# Patient Record
Sex: FEMALE | HISPANIC OR LATINO | Employment: FULL TIME | URBAN - METROPOLITAN AREA
[De-identification: names, ages, dates, MRNs, and addresses within clinical notes are randomized per-mention and may not be internally consistent; named-entity substitution may affect disease eponyms.]

---

## 2018-06-08 ENCOUNTER — HOSPITAL ENCOUNTER (OUTPATIENT)
Dept: LAB | Facility: MEDICAL CENTER | Age: 38
End: 2018-06-08
Attending: FAMILY MEDICINE

## 2018-06-08 LAB
ALBUMIN SERPL BCP-MCNC: 4.4 G/DL (ref 3.2–4.9)
ALBUMIN/GLOB SERPL: 1.5 G/DL
ALP SERPL-CCNC: 55 U/L (ref 30–99)
ALT SERPL-CCNC: 25 U/L (ref 2–50)
ANION GAP SERPL CALC-SCNC: 6 MMOL/L (ref 0–11.9)
AST SERPL-CCNC: 17 U/L (ref 12–45)
BILIRUB SERPL-MCNC: 0.4 MG/DL (ref 0.1–1.5)
BUN SERPL-MCNC: 13 MG/DL (ref 8–22)
CALCIUM SERPL-MCNC: 9.1 MG/DL (ref 8.5–10.5)
CHLORIDE SERPL-SCNC: 106 MMOL/L (ref 96–112)
CHOLEST SERPL-MCNC: 212 MG/DL (ref 100–199)
CO2 SERPL-SCNC: 25 MMOL/L (ref 20–33)
CREAT SERPL-MCNC: 0.54 MG/DL (ref 0.5–1.4)
ERYTHROCYTE [DISTWIDTH] IN BLOOD BY AUTOMATED COUNT: 44.9 FL (ref 35.9–50)
EST. AVERAGE GLUCOSE BLD GHB EST-MCNC: 111 MG/DL
GLOBULIN SER CALC-MCNC: 3 G/DL (ref 1.9–3.5)
GLUCOSE SERPL-MCNC: 89 MG/DL (ref 65–99)
HBA1C MFR BLD: 5.5 % (ref 0–5.6)
HCT VFR BLD AUTO: 41.6 % (ref 37–47)
HDLC SERPL-MCNC: 48 MG/DL
HGB BLD-MCNC: 13.6 G/DL (ref 12–16)
LDLC SERPL CALC-MCNC: 138 MG/DL
MCH RBC QN AUTO: 29.2 PG (ref 27–33)
MCHC RBC AUTO-ENTMCNC: 32.7 G/DL (ref 33.6–35)
MCV RBC AUTO: 89.5 FL (ref 81.4–97.8)
PLATELET # BLD AUTO: 257 K/UL (ref 164–446)
PMV BLD AUTO: 10 FL (ref 9–12.9)
POTASSIUM SERPL-SCNC: 4.2 MMOL/L (ref 3.6–5.5)
PROT SERPL-MCNC: 7.4 G/DL (ref 6–8.2)
RBC # BLD AUTO: 4.65 M/UL (ref 4.2–5.4)
SODIUM SERPL-SCNC: 137 MMOL/L (ref 135–145)
TRIGL SERPL-MCNC: 132 MG/DL (ref 0–149)
WBC # BLD AUTO: 6.4 K/UL (ref 4.8–10.8)

## 2018-06-08 PROCEDURE — 83036 HEMOGLOBIN GLYCOSYLATED A1C: CPT

## 2018-06-08 PROCEDURE — 80053 COMPREHEN METABOLIC PANEL: CPT

## 2018-06-08 PROCEDURE — 85027 COMPLETE CBC AUTOMATED: CPT

## 2018-06-08 PROCEDURE — 36415 COLL VENOUS BLD VENIPUNCTURE: CPT

## 2018-06-08 PROCEDURE — 80061 LIPID PANEL: CPT

## 2018-11-29 ENCOUNTER — HOSPITAL ENCOUNTER (OUTPATIENT)
Dept: LAB | Facility: MEDICAL CENTER | Age: 38
End: 2018-11-29
Attending: FAMILY MEDICINE

## 2018-11-29 LAB
ALBUMIN SERPL BCP-MCNC: 4.2 G/DL (ref 3.2–4.9)
ALBUMIN/GLOB SERPL: 1.4 G/DL
ALP SERPL-CCNC: 61 U/L (ref 30–99)
ALT SERPL-CCNC: 19 U/L (ref 2–50)
ANION GAP SERPL CALC-SCNC: 6 MMOL/L (ref 0–11.9)
AST SERPL-CCNC: 18 U/L (ref 12–45)
BILIRUB SERPL-MCNC: 0.4 MG/DL (ref 0.1–1.5)
BUN SERPL-MCNC: 12 MG/DL (ref 8–22)
CALCIUM SERPL-MCNC: 8.9 MG/DL (ref 8.5–10.5)
CHLORIDE SERPL-SCNC: 106 MMOL/L (ref 96–112)
CHOLEST SERPL-MCNC: 203 MG/DL (ref 100–199)
CO2 SERPL-SCNC: 26 MMOL/L (ref 20–33)
CREAT SERPL-MCNC: 0.62 MG/DL (ref 0.5–1.4)
GLOBULIN SER CALC-MCNC: 3 G/DL (ref 1.9–3.5)
GLUCOSE SERPL-MCNC: 98 MG/DL (ref 65–99)
HDLC SERPL-MCNC: 50 MG/DL
LDLC SERPL CALC-MCNC: 131 MG/DL
POTASSIUM SERPL-SCNC: 4.9 MMOL/L (ref 3.6–5.5)
PROT SERPL-MCNC: 7.2 G/DL (ref 6–8.2)
SODIUM SERPL-SCNC: 138 MMOL/L (ref 135–145)
TRIGL SERPL-MCNC: 112 MG/DL (ref 0–149)

## 2018-11-29 PROCEDURE — 36415 COLL VENOUS BLD VENIPUNCTURE: CPT

## 2018-11-29 PROCEDURE — 80061 LIPID PANEL: CPT

## 2018-11-29 PROCEDURE — 80053 COMPREHEN METABOLIC PANEL: CPT

## 2019-04-30 ENCOUNTER — HOSPITAL ENCOUNTER (OUTPATIENT)
Dept: LAB | Facility: MEDICAL CENTER | Age: 39
End: 2019-04-30
Attending: FAMILY MEDICINE

## 2019-04-30 LAB
ALBUMIN SERPL BCP-MCNC: 4.2 G/DL (ref 3.2–4.9)
ALBUMIN/GLOB SERPL: 1.5 G/DL
ALP SERPL-CCNC: 59 U/L (ref 30–99)
ALT SERPL-CCNC: 21 U/L (ref 2–50)
ANION GAP SERPL CALC-SCNC: 6 MMOL/L (ref 0–11.9)
AST SERPL-CCNC: 17 U/L (ref 12–45)
BILIRUB SERPL-MCNC: 0.5 MG/DL (ref 0.1–1.5)
BUN SERPL-MCNC: 13 MG/DL (ref 8–22)
CALCIUM SERPL-MCNC: 9.1 MG/DL (ref 8.5–10.5)
CHLORIDE SERPL-SCNC: 105 MMOL/L (ref 96–112)
CHOLEST SERPL-MCNC: 240 MG/DL (ref 100–199)
CO2 SERPL-SCNC: 26 MMOL/L (ref 20–33)
CREAT SERPL-MCNC: 0.57 MG/DL (ref 0.5–1.4)
FASTING STATUS PATIENT QL REPORTED: NORMAL
GLOBULIN SER CALC-MCNC: 2.8 G/DL (ref 1.9–3.5)
GLUCOSE SERPL-MCNC: 97 MG/DL (ref 65–99)
HDLC SERPL-MCNC: 53 MG/DL
LDLC SERPL CALC-MCNC: 167 MG/DL
POTASSIUM SERPL-SCNC: 4.1 MMOL/L (ref 3.6–5.5)
PROT SERPL-MCNC: 7 G/DL (ref 6–8.2)
SODIUM SERPL-SCNC: 137 MMOL/L (ref 135–145)
T4 FREE SERPL-MCNC: 0.73 NG/DL (ref 0.53–1.43)
TRIGL SERPL-MCNC: 102 MG/DL (ref 0–149)
TSH SERPL DL<=0.005 MIU/L-ACNC: 0.96 UIU/ML (ref 0.38–5.33)

## 2019-04-30 PROCEDURE — 80061 LIPID PANEL: CPT

## 2019-04-30 PROCEDURE — 84439 ASSAY OF FREE THYROXINE: CPT

## 2019-04-30 PROCEDURE — 36415 COLL VENOUS BLD VENIPUNCTURE: CPT

## 2019-04-30 PROCEDURE — 84443 ASSAY THYROID STIM HORMONE: CPT

## 2019-04-30 PROCEDURE — 80053 COMPREHEN METABOLIC PANEL: CPT

## 2020-02-19 ENCOUNTER — HOSPITAL ENCOUNTER (OUTPATIENT)
Dept: LAB | Facility: MEDICAL CENTER | Age: 40
End: 2020-02-19
Attending: FAMILY MEDICINE

## 2020-02-19 LAB
ALBUMIN SERPL BCP-MCNC: 4.6 G/DL (ref 3.2–4.9)
ALBUMIN/GLOB SERPL: 1.7 G/DL
ALP SERPL-CCNC: 57 U/L (ref 30–99)
ALT SERPL-CCNC: 24 U/L (ref 2–50)
ANION GAP SERPL CALC-SCNC: 8 MMOL/L (ref 0–11.9)
AST SERPL-CCNC: 24 U/L (ref 12–45)
BILIRUB SERPL-MCNC: 0.4 MG/DL (ref 0.1–1.5)
BUN SERPL-MCNC: 13 MG/DL (ref 8–22)
CALCIUM SERPL-MCNC: 9.2 MG/DL (ref 8.5–10.5)
CHLORIDE SERPL-SCNC: 102 MMOL/L (ref 96–112)
CHOLEST SERPL-MCNC: 252 MG/DL (ref 100–199)
CO2 SERPL-SCNC: 25 MMOL/L (ref 20–33)
CREAT SERPL-MCNC: 0.65 MG/DL (ref 0.5–1.4)
FASTING STATUS PATIENT QL REPORTED: NORMAL
GLOBULIN SER CALC-MCNC: 2.7 G/DL (ref 1.9–3.5)
GLUCOSE SERPL-MCNC: 89 MG/DL (ref 65–99)
HDLC SERPL-MCNC: 45 MG/DL
LDLC SERPL CALC-MCNC: 172 MG/DL
POTASSIUM SERPL-SCNC: 4.4 MMOL/L (ref 3.6–5.5)
PROT SERPL-MCNC: 7.3 G/DL (ref 6–8.2)
SODIUM SERPL-SCNC: 135 MMOL/L (ref 135–145)
TRIGL SERPL-MCNC: 174 MG/DL (ref 0–149)

## 2020-02-19 PROCEDURE — 80053 COMPREHEN METABOLIC PANEL: CPT

## 2020-02-19 PROCEDURE — 80061 LIPID PANEL: CPT

## 2020-02-19 PROCEDURE — 36415 COLL VENOUS BLD VENIPUNCTURE: CPT

## 2020-02-19 PROCEDURE — 83036 HEMOGLOBIN GLYCOSYLATED A1C: CPT

## 2020-02-20 LAB
EST. AVERAGE GLUCOSE BLD GHB EST-MCNC: 123 MG/DL
HBA1C MFR BLD: 5.9 % (ref 0–5.6)

## 2022-11-10 ENCOUNTER — HOSPITAL ENCOUNTER (OUTPATIENT)
Dept: RADIOLOGY | Facility: MEDICAL CENTER | Age: 42
End: 2022-11-10
Attending: FAMILY MEDICINE
Payer: OTHER GOVERNMENT

## 2022-11-10 DIAGNOSIS — Z12.31 BREAST CANCER SCREENING BY MAMMOGRAM: ICD-10-CM

## 2022-11-10 PROCEDURE — 77063 BREAST TOMOSYNTHESIS BI: CPT

## 2022-11-11 ENCOUNTER — HOSPITAL ENCOUNTER (OUTPATIENT)
Dept: RADIOLOGY | Facility: MEDICAL CENTER | Age: 42
End: 2022-11-11
Payer: OTHER GOVERNMENT

## 2024-06-03 PROCEDURE — 36415 COLL VENOUS BLD VENIPUNCTURE: CPT

## 2024-06-03 PROCEDURE — 99284 EMERGENCY DEPT VISIT MOD MDM: CPT

## 2024-06-04 ENCOUNTER — HOSPITAL ENCOUNTER (EMERGENCY)
Facility: MEDICAL CENTER | Age: 44
End: 2024-06-04
Attending: STUDENT IN AN ORGANIZED HEALTH CARE EDUCATION/TRAINING PROGRAM
Payer: OTHER GOVERNMENT

## 2024-06-04 ENCOUNTER — APPOINTMENT (OUTPATIENT)
Dept: RADIOLOGY | Facility: MEDICAL CENTER | Age: 44
End: 2024-06-04
Attending: STUDENT IN AN ORGANIZED HEALTH CARE EDUCATION/TRAINING PROGRAM
Payer: OTHER GOVERNMENT

## 2024-06-04 VITALS
HEART RATE: 67 BPM | TEMPERATURE: 98.6 F | DIASTOLIC BLOOD PRESSURE: 63 MMHG | OXYGEN SATURATION: 96 % | WEIGHT: 152.12 LBS | RESPIRATION RATE: 18 BRPM | HEIGHT: 64 IN | BODY MASS INDEX: 25.97 KG/M2 | SYSTOLIC BLOOD PRESSURE: 105 MMHG

## 2024-06-04 DIAGNOSIS — R10.33 PERIUMBILICAL ABDOMINAL PAIN: ICD-10-CM

## 2024-06-04 DIAGNOSIS — R19.7 DIARRHEA, UNSPECIFIED TYPE: ICD-10-CM

## 2024-06-04 LAB
ALBUMIN SERPL BCP-MCNC: 4.4 G/DL (ref 3.2–4.9)
ALBUMIN/GLOB SERPL: 1.5 G/DL
ALP SERPL-CCNC: 66 U/L (ref 30–99)
ALT SERPL-CCNC: 19 U/L (ref 2–50)
ANION GAP SERPL CALC-SCNC: 12 MMOL/L (ref 7–16)
APPEARANCE UR: CLEAR
AST SERPL-CCNC: 20 U/L (ref 12–45)
BASOPHILS # BLD AUTO: 0.5 % (ref 0–1.8)
BASOPHILS # BLD: 0.05 K/UL (ref 0–0.12)
BILIRUB SERPL-MCNC: <0.2 MG/DL (ref 0.1–1.5)
BILIRUB UR QL STRIP.AUTO: NEGATIVE
BUN SERPL-MCNC: 13 MG/DL (ref 8–22)
CALCIUM ALBUM COR SERPL-MCNC: 9.2 MG/DL (ref 8.5–10.5)
CALCIUM SERPL-MCNC: 9.5 MG/DL (ref 8.5–10.5)
CHLORIDE SERPL-SCNC: 105 MMOL/L (ref 96–112)
CO2 SERPL-SCNC: 23 MMOL/L (ref 20–33)
COLOR UR: YELLOW
CREAT SERPL-MCNC: 0.6 MG/DL (ref 0.5–1.4)
EOSINOPHIL # BLD AUTO: 0.05 K/UL (ref 0–0.51)
EOSINOPHIL NFR BLD: 0.5 % (ref 0–6.9)
ERYTHROCYTE [DISTWIDTH] IN BLOOD BY AUTOMATED COUNT: 47.8 FL (ref 35.9–50)
GFR SERPLBLD CREATININE-BSD FMLA CKD-EPI: 113 ML/MIN/1.73 M 2
GLOBULIN SER CALC-MCNC: 2.9 G/DL (ref 1.9–3.5)
GLUCOSE SERPL-MCNC: 97 MG/DL (ref 65–99)
GLUCOSE UR STRIP.AUTO-MCNC: NEGATIVE MG/DL
HCG SERPL QL: NEGATIVE
HCT VFR BLD AUTO: 34 % (ref 37–47)
HGB BLD-MCNC: 10.7 G/DL (ref 12–16)
IMM GRANULOCYTES # BLD AUTO: 0.07 K/UL (ref 0–0.11)
IMM GRANULOCYTES NFR BLD AUTO: 0.7 % (ref 0–0.9)
KETONES UR STRIP.AUTO-MCNC: NEGATIVE MG/DL
LEUKOCYTE ESTERASE UR QL STRIP.AUTO: NEGATIVE
LIPASE SERPL-CCNC: 39 U/L (ref 11–82)
LYMPHOCYTES # BLD AUTO: 2.43 K/UL (ref 1–4.8)
LYMPHOCYTES NFR BLD: 25.1 % (ref 22–41)
MCH RBC QN AUTO: 24.2 PG (ref 27–33)
MCHC RBC AUTO-ENTMCNC: 31.5 G/DL (ref 32.2–35.5)
MCV RBC AUTO: 76.9 FL (ref 81.4–97.8)
MICRO URNS: NORMAL
MONOCYTES # BLD AUTO: 0.65 K/UL (ref 0–0.85)
MONOCYTES NFR BLD AUTO: 6.7 % (ref 0–13.4)
NEUTROPHILS # BLD AUTO: 6.43 K/UL (ref 1.82–7.42)
NEUTROPHILS NFR BLD: 66.5 % (ref 44–72)
NITRITE UR QL STRIP.AUTO: NEGATIVE
NRBC # BLD AUTO: 0 K/UL
NRBC BLD-RTO: 0 /100 WBC (ref 0–0.2)
PH UR STRIP.AUTO: 5 [PH] (ref 5–8)
PLATELET # BLD AUTO: 343 K/UL (ref 164–446)
PMV BLD AUTO: 9.7 FL (ref 9–12.9)
POTASSIUM SERPL-SCNC: 4.3 MMOL/L (ref 3.6–5.5)
PROT SERPL-MCNC: 7.3 G/DL (ref 6–8.2)
PROT UR QL STRIP: NEGATIVE MG/DL
RBC # BLD AUTO: 4.42 M/UL (ref 4.2–5.4)
RBC UR QL AUTO: NEGATIVE
SODIUM SERPL-SCNC: 140 MMOL/L (ref 135–145)
SP GR UR STRIP.AUTO: 1.02
UROBILINOGEN UR STRIP.AUTO-MCNC: 0.2 MG/DL
WBC # BLD AUTO: 9.7 K/UL (ref 4.8–10.8)

## 2024-06-04 PROCEDURE — 74177 CT ABD & PELVIS W/CONTRAST: CPT

## 2024-06-04 PROCEDURE — A9270 NON-COVERED ITEM OR SERVICE: HCPCS | Performed by: STUDENT IN AN ORGANIZED HEALTH CARE EDUCATION/TRAINING PROGRAM

## 2024-06-04 PROCEDURE — 84703 CHORIONIC GONADOTROPIN ASSAY: CPT

## 2024-06-04 PROCEDURE — 80053 COMPREHEN METABOLIC PANEL: CPT

## 2024-06-04 PROCEDURE — 85025 COMPLETE CBC W/AUTO DIFF WBC: CPT

## 2024-06-04 PROCEDURE — 83690 ASSAY OF LIPASE: CPT

## 2024-06-04 PROCEDURE — 700117 HCHG RX CONTRAST REV CODE 255: Performed by: STUDENT IN AN ORGANIZED HEALTH CARE EDUCATION/TRAINING PROGRAM

## 2024-06-04 PROCEDURE — 700102 HCHG RX REV CODE 250 W/ 637 OVERRIDE(OP): Performed by: STUDENT IN AN ORGANIZED HEALTH CARE EDUCATION/TRAINING PROGRAM

## 2024-06-04 PROCEDURE — 81003 URINALYSIS AUTO W/O SCOPE: CPT

## 2024-06-04 RX ORDER — ACETAMINOPHEN 500 MG
1000 TABLET ORAL ONCE
Status: COMPLETED | OUTPATIENT
Start: 2024-06-04 | End: 2024-06-04

## 2024-06-04 RX ADMIN — ACETAMINOPHEN 1000 MG: 500 TABLET, FILM COATED ORAL at 03:12

## 2024-06-04 RX ADMIN — IOHEXOL 80 ML: 350 INJECTION, SOLUTION INTRAVENOUS at 03:29

## 2024-06-04 NOTE — ED NOTES
RN reviewed d/c instructions w/ patient *and visitor*  including follow up and prescription information. Pt is alert and oriented. Pt  / visitor verbalized understanding of all instructions for discharge and is agreeable to plan of care. Pt is ambulatory out of ED with steady gait accompanied by family.  PIV removed.     Pt agrees not to drive or operate any heavy machinery after receiving / ingesting controlled substances.  Pt  educated on risks/ benefits of being prescribed a controlled substances and agrees to  use prescription  as prescribed by MD.

## 2024-06-04 NOTE — ED NOTES
Pt ambulated to ER room 10 with steady gait , pt placed on gurney , pt changed into gown , pt attached to monitor Pt given call light and instructions on how to use, pt chart up for ERP to see.

## 2024-06-04 NOTE — DISCHARGE INSTRUCTIONS
You were seen for abdominal pain.  Your CT scan shows that you have small amount of inflammation in your intestines which is likely caused by a virus.  Symptoms will be self-limiting.  Please treat with Tylenol and Motrin.  Make sure that you stay adequately hydrated.  Discussed these results with the GI doctor at your appointment on Thursday.  Return if you have any blood in your stool, worsening pain, any other concerns.    Results for orders placed or performed during the hospital encounter of 06/04/24   CBC WITH DIFFERENTIAL   Result Value Ref Range    WBC 9.7 4.8 - 10.8 K/uL    RBC 4.42 4.20 - 5.40 M/uL    Hemoglobin 10.7 (L) 12.0 - 16.0 g/dL    Hematocrit 34.0 (L) 37.0 - 47.0 %    MCV 76.9 (L) 81.4 - 97.8 fL    MCH 24.2 (L) 27.0 - 33.0 pg    MCHC 31.5 (L) 32.2 - 35.5 g/dL    RDW 47.8 35.9 - 50.0 fL    Platelet Count 343 164 - 446 K/uL    MPV 9.7 9.0 - 12.9 fL    Neutrophils-Polys 66.50 44.00 - 72.00 %    Lymphocytes 25.10 22.00 - 41.00 %    Monocytes 6.70 0.00 - 13.40 %    Eosinophils 0.50 0.00 - 6.90 %    Basophils 0.50 0.00 - 1.80 %    Immature Granulocytes 0.70 0.00 - 0.90 %    Nucleated RBC 0.00 0.00 - 0.20 /100 WBC    Neutrophils (Absolute) 6.43 1.82 - 7.42 K/uL    Lymphs (Absolute) 2.43 1.00 - 4.80 K/uL    Monos (Absolute) 0.65 0.00 - 0.85 K/uL    Eos (Absolute) 0.05 0.00 - 0.51 K/uL    Baso (Absolute) 0.05 0.00 - 0.12 K/uL    Immature Granulocytes (abs) 0.07 0.00 - 0.11 K/uL    NRBC (Absolute) 0.00 K/uL   COMP METABOLIC PANEL   Result Value Ref Range    Sodium 140 135 - 145 mmol/L    Potassium 4.3 3.6 - 5.5 mmol/L    Chloride 105 96 - 112 mmol/L    Co2 23 20 - 33 mmol/L    Anion Gap 12.0 7.0 - 16.0    Glucose 97 65 - 99 mg/dL    Bun 13 8 - 22 mg/dL    Creatinine 0.60 0.50 - 1.40 mg/dL    Calcium 9.5 8.5 - 10.5 mg/dL    Correct Calcium 9.2 8.5 - 10.5 mg/dL    AST(SGOT) 20 12 - 45 U/L    ALT(SGPT) 19 2 - 50 U/L    Alkaline Phosphatase 66 30 - 99 U/L    Total Bilirubin <0.2 0.1 - 1.5 mg/dL    Albumin 4.4  3.2 - 4.9 g/dL    Total Protein 7.3 6.0 - 8.2 g/dL    Globulin 2.9 1.9 - 3.5 g/dL    A-G Ratio 1.5 g/dL   LIPASE   Result Value Ref Range    Lipase 39 11 - 82 U/L   HCG QUAL SERUM   Result Value Ref Range    Beta-Hcg Qualitative Serum Negative Negative   URINALYSIS,CULTURE IF INDICATED    Specimen: Urine   Result Value Ref Range    Color Yellow     Character Clear     Specific Gravity 1.020 <1.035    Ph 5.0 5.0 - 8.0    Glucose Negative Negative mg/dL    Ketones Negative Negative mg/dL    Protein Negative Negative mg/dL    Bilirubin Negative Negative    Urobilinogen, Urine 0.2 Negative    Nitrite Negative Negative    Leukocyte Esterase Negative Negative    Occult Blood Negative Negative    Micro Urine Req see below    ESTIMATED GFR   Result Value Ref Range    GFR (CKD-EPI) 113 >60 mL/min/1.73 m 2     CT-ABDOMEN-PELVIS WITH   Final Result         1.  Fluid-filled small bowel and colon, appearance suggesting ileus and/or enteritis.   2.  Fat-containing umbilical hernia

## 2024-06-04 NOTE — ED NOTES
Pt back from CT Pt resting on gurney, pt attached to monitor, respirations are equal and unlabored, call light in reach , bed locked and in lowest position, no needs at this time

## 2024-06-04 NOTE — ED TRIAGE NOTES
"Chief Complaint   Patient presents with    Abdominal Pain     Around umbilicus, radiating to back. Pressure/ tension, 10:10, colicky in nature. Onset 1830 hours at rest. No hx of same. Recent colonoscopy. Has AB pain prior to colonoscopy. Different location. + nausea. Normal urination. Diarrhea since onset of symptoms.      Pt ambulates from [facility] with steady gait. Skin signs pink, warm, dry. Pt speaking full sentences, A & O x 4. Translation services used #[id number]. Respirations equal and unlabored. Pt educated on triage process and to alert staff of any changing conditions. [facility]     /69   Pulse 74   Temp 36.1 °C (96.9 °F) (Oral)   Resp 20   Ht 1.626 m (5' 4\")   Wt 69 kg (152 lb 1.9 oz)   LMP 05/30/2024   SpO2 100%   BMI 26.11 kg/m²       "

## 2024-06-04 NOTE — ED PROVIDER NOTES
ED Provider Note    CHIEF COMPLAINT  Chief Complaint   Patient presents with    Abdominal Pain     Around umbilicus, radiating to back. Pressure/ tension, 10:10, colicky in nature. Onset 1830 hours at rest. No hx of same. Recent colonoscopy. Has AB pain prior to colonoscopy. Different location. + nausea. Normal urination. Diarrhea since onset of symptoms.        EXTERNAL RECORDS REVIEWED  Outpatient Notes patient was seen at [facility] for left ankle instability    HPI/ROS  LIMITATION TO HISTORY   Select: : None  OUTSIDE HISTORIAN(S):  None    Vicente Sade Albarado is a 43 y.o. female who presents for evaluation of cramping, periumbilical abdominal pain which started at 6:30 PM.  She is also having diarrhea.  She reports several episodes of diarrhea.  There is no blood in her diarrhea or melena.  She denies any fever, nausea, vomiting, dysuria, vaginal discharge, chest pain, difficulty breathing.  The patient states that she has been having left sided abdominal pain for quite some time.  She does had a colonoscopy May 16 for evaluation of this pain and only found was 2 polyps.  She has follow-up with GI in 2 days.  She has never had any abdominal surgeries.  She takes no daily medications.  She denies any camping, drinking from streams, recent international travel, recent antibiotic use.  She denies sick contacts.  She notes that recently over the past few months she has been having heavier periods and bleeds heavy for all 5 days which is a change.  She thinks that she is perimenopausal.    PAST MEDICAL HISTORY   She has no chronic medical problems    SURGICAL HISTORY  patient denies any surgical history    FAMILY HISTORY  History reviewed. No pertinent family history.    SOCIAL HISTORY  Social History     Tobacco Use    Smoking status: Never    Smokeless tobacco: Never   Vaping Use    Vaping status: Never Used   Substance and Sexual Activity    Alcohol use: No    Drug use: No    Sexual  "activity: Not on file       CURRENT MEDICATIONS  Home Medications       Reviewed by Leta Steen R.N. (Registered Nurse) on 06/03/24 at 2339  Med List Status: Partial     Medication Last Dose Status        Patient Fox Taking any Medications                           ALLERGIES  No Known Allergies    PHYSICAL EXAM  VITAL SIGNS: /69   Pulse 74   Temp 36.1 °C (96.9 °F) (Oral)   Resp 20   Ht 1.626 m (5' 4\")   Wt 69 kg (152 lb 1.9 oz)   LMP 05/30/2024   SpO2 100%   BMI 26.11 kg/m²      Constitutional: Well developed, Well nourished, No acute distress, Non-toxic appearance.   HEENT: Normocephalic, Atraumatic,  external ears normal, pharynx pink,  Mucous  Membranes moist, No rhinorrhea or mucosal edema  Eyes: PERRL, EOMI, Conjunctiva normal, No discharge.   Neck: Normal range of motion, No tenderness, Supple, No stridor.    Cardiovascular: Regular Rate and Rhythm, No murmurs,  rubs, or gallops.   Thorax & Lungs: Lungs clear to auscultation bilaterally, No respiratory distress, No wheezes, rhales or rhonchi, No chest wall tenderness.   Abdomen: Soft, periumbilical tenderness to palpation, reports left-sided abdominal pain and tenderness palpation but states that this is her chronic pain, non distended,  No pulsatile masses., no rebound guarding or peritoneal signs.   Skin: Warm, Dry, No erythema, No rash,   Back:  No CVA tenderness,  No spinal tenderness, bony crepitance step offs or instability.   Extremities: Equal, intact distal pulses, No cyanosis, clubbing or edema,  No tenderness.   Musculoskeletal: Good range of motion in all major joints. No tenderness to palpation or major deformities noted.   Neurologic: Alert & oriented No focal deficits noted.  Psychiatric: Affect normal, Judgment normal, Mood normal.      EKG/LABS  Results for orders placed or performed during the hospital encounter of 06/04/24   CBC WITH DIFFERENTIAL   Result Value Ref Range    WBC 9.7 4.8 - 10.8 K/uL    RBC 4.42 4.20 - 5.40 " M/uL    Hemoglobin 10.7 (L) 12.0 - 16.0 g/dL    Hematocrit 34.0 (L) 37.0 - 47.0 %    MCV 76.9 (L) 81.4 - 97.8 fL    MCH 24.2 (L) 27.0 - 33.0 pg    MCHC 31.5 (L) 32.2 - 35.5 g/dL    RDW 47.8 35.9 - 50.0 fL    Platelet Count 343 164 - 446 K/uL    MPV 9.7 9.0 - 12.9 fL    Neutrophils-Polys 66.50 44.00 - 72.00 %    Lymphocytes 25.10 22.00 - 41.00 %    Monocytes 6.70 0.00 - 13.40 %    Eosinophils 0.50 0.00 - 6.90 %    Basophils 0.50 0.00 - 1.80 %    Immature Granulocytes 0.70 0.00 - 0.90 %    Nucleated RBC 0.00 0.00 - 0.20 /100 WBC    Neutrophils (Absolute) 6.43 1.82 - 7.42 K/uL    Lymphs (Absolute) 2.43 1.00 - 4.80 K/uL    Monos (Absolute) 0.65 0.00 - 0.85 K/uL    Eos (Absolute) 0.05 0.00 - 0.51 K/uL    Baso (Absolute) 0.05 0.00 - 0.12 K/uL    Immature Granulocytes (abs) 0.07 0.00 - 0.11 K/uL    NRBC (Absolute) 0.00 K/uL   COMP METABOLIC PANEL   Result Value Ref Range    Sodium 140 135 - 145 mmol/L    Potassium 4.3 3.6 - 5.5 mmol/L    Chloride 105 96 - 112 mmol/L    Co2 23 20 - 33 mmol/L    Anion Gap 12.0 7.0 - 16.0    Glucose 97 65 - 99 mg/dL    Bun 13 8 - 22 mg/dL    Creatinine 0.60 0.50 - 1.40 mg/dL    Calcium 9.5 8.5 - 10.5 mg/dL    Correct Calcium 9.2 8.5 - 10.5 mg/dL    AST(SGOT) 20 12 - 45 U/L    ALT(SGPT) 19 2 - 50 U/L    Alkaline Phosphatase 66 30 - 99 U/L    Total Bilirubin <0.2 0.1 - 1.5 mg/dL    Albumin 4.4 3.2 - 4.9 g/dL    Total Protein 7.3 6.0 - 8.2 g/dL    Globulin 2.9 1.9 - 3.5 g/dL    A-G Ratio 1.5 g/dL   LIPASE   Result Value Ref Range    Lipase 39 11 - 82 U/L   HCG QUAL SERUM   Result Value Ref Range    Beta-Hcg Qualitative Serum Negative Negative   URINALYSIS,CULTURE IF INDICATED    Specimen: Urine   Result Value Ref Range    Color Yellow     Character Clear     Specific Gravity 1.020 <1.035    Ph 5.0 5.0 - 8.0    Glucose Negative Negative mg/dL    Ketones Negative Negative mg/dL    Protein Negative Negative mg/dL    Bilirubin Negative Negative    Urobilinogen, Urine 0.2 Negative    Nitrite Negative  Negative    Leukocyte Esterase Negative Negative    Occult Blood Negative Negative    Micro Urine Req see below    ESTIMATED GFR   Result Value Ref Range    GFR (CKD-EPI) 113 >60 mL/min/1.73 m 2       I have independently interpreted this EKG    RADIOLOGY/PROCEDURES   I have independently interpreted the diagnostic imaging associated with this visit and am waiting the final reading from the radiologist.   My preliminary interpretation is as follows: no free air in abdomen    Radiologist interpretation:  CT-ABDOMEN-PELVIS WITH   Final Result         1.  Fluid-filled small bowel and colon, appearance suggesting ileus and/or enteritis.   2.  Fat-containing umbilical hernia          COURSE & MEDICAL DECISION MAKING    ASSESSMENT, COURSE AND PLAN  Care Narrative:   This is a 43-year-old female with history as noted above who is presenting for evaluation of cramping periumbilical abdominal pain which started this evening at 6:30 PM.  She is also having diarrhea.  On arrival her vital signs are stable.  She is nontoxic in appearance.  Her abdomen exam is reassuring but she does have periumbilical tenderness to palpation.    I considered gastroenteritis, appendicitis, diverticulitis, electrolyte abnormality, dehydration, acute kidney injury, ureteral stone, UTI, pancreatitis, less likely gallbladder pathology.  Much less likely colonoscopy complication given colonoscopy was done May 16.  She has results with her that showed that it was normal aside from 2 polyps that were removed.    Labs are obtained and she has no leukocytosis.  She is anemic with hemoglobin 10.7 and she notes that she has been having heavy periods lately.  She is not pregnant thus ruling out ectopic pregnancy.  Electrolytes within normal limits.  LFTs within normal limits.  UA negative for infection.  CT scan of the abdomen was obtained and shows fluid-filled small bowel and colon which is consistent with enteritis.  I do think that this is likely  virally mediated as patient has no risk factors for bacterial dysentery, no blood in stool, no rash factors for parasitic infection or C. difficile.  Discussed results with patient.  Discussed that she may treat with Tylenol and Motrin and make sure that she stays adequately hydrated.  She has follow-up with GI in 2 days thankfully.  She states that her pain is well-controlled.  She feels comfortable discharge home.  She was provided copy of all results.  She is agreeable discharge plan with no further questions.            ADDITIONAL PROBLEMS MANAGED  None    DISPOSITION AND DISCUSSIONS  I have discussed management of the patient with the following physicians and SUJATHA's:  None    Discussion of management with other [facility] or appropriate source(s): None     Escalation of care considered, and ultimately not performed:acute inpatient care management, however at this time, the patient is most appropriate for outpatient management    Barriers to care at this time, including but not limited to:  None .     Decision tools and prescription drugs considered including, but not limited to:  None .    The patient will return for new or worsening symptoms and is stable at the time of discharge.    The patient is referred to a primary physician for blood pressure management, diabetic screening, and for all other preventative health concerns.    DISPOSITION:  Patient will be discharged home in stable condition.    FOLLOW UP:  Mohsen Tamasaby, M.D.  [facility] [id number] [phone]          [facility], Emergency Dept  [address] [facility] [place] [id number] [phone]          OUTPATIENT MEDICATIONS:  New Prescriptions    No medications on file         FINAL DIAGNOSIS  1. Periumbilical abdominal pain    2. Diarrhea, unspecified type           Electronically signed by: Michelle Trent M.D., 6/4/2024 2:14 AM

## 2024-07-10 ENCOUNTER — HOSPITAL ENCOUNTER (OUTPATIENT)
Dept: RADIOLOGY | Facility: MEDICAL CENTER | Age: 44
End: 2024-07-10
Payer: COMMERCIAL

## 2024-07-10 DIAGNOSIS — N92.0 MENORRHAGIA WITH REGULAR CYCLE: ICD-10-CM

## 2024-07-10 PROCEDURE — 76830 TRANSVAGINAL US NON-OB: CPT

## 2024-09-04 ENCOUNTER — HOSPITAL ENCOUNTER (OUTPATIENT)
Dept: RADIOLOGY | Facility: MEDICAL CENTER | Age: 44
End: 2024-09-04
Attending: NURSE PRACTITIONER
Payer: COMMERCIAL

## 2024-09-04 DIAGNOSIS — R93.89 THICKENED ENDOMETRIUM: ICD-10-CM

## 2024-09-04 PROCEDURE — 76830 TRANSVAGINAL US NON-OB: CPT

## 2024-09-19 PROBLEM — M79.672 PAIN OF LEFT FOOT: Status: ACTIVE | Noted: 2024-09-19

## 2025-02-11 ENCOUNTER — HOSPITAL ENCOUNTER (OUTPATIENT)
Dept: RADIOLOGY | Facility: MEDICAL CENTER | Age: 45
End: 2025-02-11
Attending: NURSE PRACTITIONER
Payer: COMMERCIAL

## 2025-02-11 DIAGNOSIS — N83.202 BILATERAL OVARIAN CYSTS: ICD-10-CM

## 2025-02-11 DIAGNOSIS — N83.201 BILATERAL OVARIAN CYSTS: ICD-10-CM

## 2025-02-11 PROCEDURE — 76830 TRANSVAGINAL US NON-OB: CPT

## 2025-02-12 ENCOUNTER — RESULTS FOLLOW-UP (OUTPATIENT)
Dept: OBGYN | Facility: MEDICAL CENTER | Age: 45
End: 2025-02-12

## 2025-07-20 ENCOUNTER — APPOINTMENT (OUTPATIENT)
Dept: RADIOLOGY | Facility: MEDICAL CENTER | Age: 45
DRG: 493 | End: 2025-07-20
Attending: EMERGENCY MEDICINE
Payer: COMMERCIAL

## 2025-07-20 ENCOUNTER — HOSPITAL ENCOUNTER (INPATIENT)
Facility: MEDICAL CENTER | Age: 45
LOS: 7 days | DRG: 493 | End: 2025-07-28
Attending: EMERGENCY MEDICINE | Admitting: STUDENT IN AN ORGANIZED HEALTH CARE EDUCATION/TRAINING PROGRAM
Payer: COMMERCIAL

## 2025-07-20 ASSESSMENT — FIBROSIS 4 INDEX: FIB4 SCORE: 0.6

## 2025-07-21 ENCOUNTER — ANESTHESIA EVENT (OUTPATIENT)
Dept: SURGERY | Facility: MEDICAL CENTER | Age: 45
DRG: 493 | End: 2025-07-21
Payer: COMMERCIAL

## 2025-07-21 ENCOUNTER — APPOINTMENT (OUTPATIENT)
Dept: RADIOLOGY | Facility: MEDICAL CENTER | Age: 45
DRG: 493 | End: 2025-07-21
Attending: EMERGENCY MEDICINE
Payer: COMMERCIAL

## 2025-07-21 PROBLEM — S92.255A CLOSED NONDISPLACED FRACTURE OF NAVICULAR BONE OF LEFT FOOT: Status: ACTIVE | Noted: 2025-07-21

## 2025-07-21 PROBLEM — S82.851A CLOSED TRIMALLEOLAR FRACTURE OF RIGHT ANKLE, INITIAL ENCOUNTER: Status: ACTIVE | Noted: 2025-07-21
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
## 2025-07-21 PROBLEM — E87.20 NORMAL ANION GAP METABOLIC ACIDOSIS: Status: ACTIVE | Noted: 2025-07-21

## 2025-07-21 SDOH — ECONOMIC STABILITY: TRANSPORTATION INSECURITY
IN THE PAST 12 MONTHS, HAS LACK OF RELIABLE TRANSPORTATION KEPT YOU FROM MEDICAL APPOINTMENTS, MEETINGS, WORK OR FROM GETTING THINGS NEEDED FOR DAILY LIVING?: NO

## 2025-07-21 SDOH — ECONOMIC STABILITY: TRANSPORTATION INSECURITY
IN THE PAST 12 MONTHS, HAS THE LACK OF TRANSPORTATION KEPT YOU FROM MEDICAL APPOINTMENTS OR FROM GETTING MEDICATIONS?: NO

## 2025-07-21 ASSESSMENT — ENCOUNTER SYMPTOMS
GASTROINTESTINAL NEGATIVE: 1
NAUSEA: 0
RESPIRATORY NEGATIVE: 1
EYES NEGATIVE: 1
PALPITATIONS: 0
CARDIOVASCULAR NEGATIVE: 1
HEADACHES: 0
SHORTNESS OF BREATH: 0
ABDOMINAL PAIN: 0
NECK PAIN: 0
EYE PAIN: 0
BLURRED VISION: 0
DOUBLE VISION: 0
BLOOD IN STOOL: 0
DIZZINESS: 0
CONSTITUTIONAL NEGATIVE: 1
WHEEZING: 0
FEVER: 0
COUGH: 0
VOMITING: 0
DIARRHEA: 0
CONSTIPATION: 0
CHILLS: 0
BACK PAIN: 0

## 2025-07-21 ASSESSMENT — LIFESTYLE VARIABLES
ALCOHOL_USE: NO
EVER FELT BAD OR GUILTY ABOUT YOUR DRINKING: NO
CONSUMPTION TOTAL: NEGATIVE
HAVE YOU EVER FELT YOU SHOULD CUT DOWN ON YOUR DRINKING: NO
HOW MANY TIMES IN THE PAST YEAR HAVE YOU HAD 5 OR MORE DRINKS IN A DAY: 0
TOTAL SCORE: 0
EVER HAD A DRINK FIRST THING IN THE MORNING TO STEADY YOUR NERVES TO GET RID OF A HANGOVER: NO
HAVE PEOPLE ANNOYED YOU BY CRITICIZING YOUR DRINKING: NO
DOES PATIENT WANT TO STOP DRINKING: NO
TOTAL SCORE: 0
ON A TYPICAL DAY WHEN YOU DRINK ALCOHOL HOW MANY DRINKS DO YOU HAVE: 0
TOTAL SCORE: 0
AVERAGE NUMBER OF DAYS PER WEEK YOU HAVE A DRINK CONTAINING ALCOHOL: 0

## 2025-07-21 ASSESSMENT — PAIN DESCRIPTION - PAIN TYPE
TYPE: ACUTE PAIN
TYPE: ACUTE PAIN;SURGICAL PAIN
TYPE: ACUTE PAIN
TYPE: ACUTE PAIN;SURGICAL PAIN
TYPE: ACUTE PAIN
TYPE: ACUTE PAIN

## 2025-07-21 ASSESSMENT — SOCIAL DETERMINANTS OF HEALTH (SDOH)

## 2025-07-21 ASSESSMENT — PATIENT HEALTH QUESTIONNAIRE - PHQ9
SUM OF ALL RESPONSES TO PHQ9 QUESTIONS 1 AND 2: 0
1. LITTLE INTEREST OR PLEASURE IN DOING THINGS: NOT AT ALL
2. FEELING DOWN, DEPRESSED, IRRITABLE, OR HOPELESS: NOT AT ALL

## 2025-07-21 NOTE — ED NOTES
Attempted to stand and pivot pt on left foot onto bedside commode, pt unable to tolerate weight on either foot. Pt back in bed, purewick placed, ERP notified.

## 2025-07-21 NOTE — ED PROVIDER NOTES
"ER Provider Note    Scribed for  Marvin Paulson D.O. by Chidi Stoddrad. 7/20/2025   11:14 PM    Primary Care Provider: Mohsen Tamasaby, M.D.    CHIEF COMPLAINT  Chief Complaint   Patient presents with    T-5000 GLF     Pt said she fell from the trailer  (not moving) since 20 minutes ago  + she hit her both legs - causing pain    Denied any head injury, loss of consciousness or taking blood thinners     EXTERNAL RECORDS REVIEWED  Outpatient Notes Patient was seen for an office visit on 7/14 for traumatic injury of common peroneal nerve.    HPI/ROS  LIMITATION TO HISTORY   Select: Language Latvian  OUTSIDE HISTORIAN(S):  Family is present at bedside to assist in providing patient's history.    Cinthia Albarado is a 45 y.o. female who presents to the ED for evaluation of acute bilateral lower extremity pain onset 20 minutes ago after falling from a nonmoving trailer. Denies head strike or loss of consciousness, denies blood thinners.     PAST MEDICAL HISTORY  Past Medical History[1]    SURGICAL HISTORY  Past Surgical History[2]    FAMILY HISTORY  History reviewed. No pertinent family history.    SOCIAL HISTORY   reports that she has never smoked. She has never used smokeless tobacco. She reports that she does not drink alcohol and does not use drugs.    CURRENT MEDICATIONS  Previous Medications    No medications noted       ALLERGIES  Allergies[3]     PHYSICAL EXAM  /76   Pulse 82   Temp 36.2 °C (97.2 °F) (Temporal)   Resp 18   Ht 1.676 m (5' 6\")   Wt 77.1 kg (170 lb)   SpO2 98%   BMI 27.44 kg/m²    General: No acute distress  Neuro: Awake alert and oriented, muscle strength sensation normal  Neck: Supple, FROM, no cervical spinal tenderness  Cardiac: Regular rate and rhythm  Pulmonary: Clear to auscultation bilaterally no distress  Abdomen: Soft nontender nondistended  Back: Nontender, no CVA tenderness, no spinal tenderness  Psych: Normal  Skin: Pink warm dry, no rash  Extremities: " abrasions to bilateral knees, right ankle has effusion, swelling and tenderness to lateral medial malleolus, dp pulses 2+, compartments soft, muscle strength sensation intact 2+ pulses x 4, left  over the top.  Normal circulation motor and sensation.    DIAGNOSTIC STUDIES/PROCEDURES    Radiology:   This attending emergency physician has independently interpreted the diagnostic imaging associated with this visit and is awaiting the final reading from the radiologist.   Preliminary interpretation is a follows: Left-sided navicular fracture.  Right-sided trimalleolar fracture  Radiologist interpretation:   DX-ANKLE 3+ VIEWS LEFT   Final Result         1.  Anterior superior navicular fracture         DX-ANKLE 3+ VIEWS RIGHT   Final Result         1.  Trimalleolar fracture         DX-KNEE 3 VIEWS RIGHT   Final Result         1.  No acute traumatic bony injury.      DX-TIBIA AND FIBULA LEFT   Final Result         1.  No acute traumatic bony injury.      DX-TIBIA AND FIBULA RIGHT   Final Result         1.  Trimalleolar fracture      DX-KNEE 3 VIEWS LEFT   Final Result         1.  No acute traumatic bony injury.      CT-ANKLE W/O PLUS RECONS RIGHT    (Results Pending)   CT-FOOT W/O PLUS RECONS LEFT    (Results Pending)        COURSE & MEDICAL DECISION MAKING     INITIAL ASSESSMENT, COURSE AND PLAN  Differential diagnoses include but not limited to: Contusion, fracture, sprain, dislocation.     Care Narrative: Cinthia Albarado is a 45 year old female presenting to the Emergency Department for evaluation of bilateral lower extremity pain onset 20 minutes ago secondary to a fall off of a trailer. Exam notable for abrasions to bilateral knees, and effusion, swelling and tenderness to the lateral malleolus of the right ankle. Ordered for imaging to further evaluate.    11:14 PM - Patient was first seen and evaluated at bedside. Patient presents to the ED for evaluation of bilateral leg pain onset 20 minutes ago  secondary to a fall from a non mobile trailer. Ordered for DX knee 3 views left, DX 3 views right, DX tibia and fibula left and right, DX ankle 3+ views left and right to evaluate. The patient will be medicated with Percocet 325 mg for her symptoms. Patient verbalizes understanding and support with my plan of care.     Splint Application and Check        Authorized by: Marvin Paulson      Consent: Verbal consent obtained.      Risks and benefits: risks, benefits and alternatives were discussed      Consent given by: patient      Patient understanding: patient states understanding of the procedure being performed      Patient consent: the patient's understanding of the procedure matches consent given      Patient identity confirmed: verbally with patient and arm band      Time out: Immediately prior to procedure we verified the correct patient, procedure, equipment, support staff and site/side marked as required.      Location details: Right lower extremity, three sided orthoglass splint with padding, short leg.       Post-procedure: The splinted body part was neurovascularly unchanged following the procedure.      Patient tolerance: Patient tolerated the procedure well with no immediate complications. Normal post splint CMS.        ED COURSE AND ADDITIONAL PROBLEMS    1. Closed trimalleolar fracture of right ankle, initial encounter Acute   2. Closed nondisplaced fracture of navicular bone of left foot, initial encounter Acute   3. Closed trimalleolar fracture of left ankle, initial encounter    Discussed the case with Dr. Fraser.  He had requested CTs.  We discussed the right lower extremity trimalleolar and he agreed with a 3 sided splint which was placed good circulation motor and sensation status post splint placement.  Signed out patient to Dr. Card pending CTs of the bilateral ankle/foot.  And disposition    Medications   oxyCODONE-acetaminophen (Percocet) 5-325 MG per tablet 1 Tablet (1 Tablet Oral Given  7/20/25 3791)   oxyCODONE-acetaminophen (Percocet) 5-325 MG per tablet 1 Tablet (1 Tablet Oral Given 7/21/25 0108)       DISPOSITION AND DISCUSSIONS    I have discussed management of the patient with the following physicians and SUJATHA's: Dr. Fraser    Discussion of management with other [facility] or appropriate source(s): ER technician. Xr tech.     Escalation of care considered, and ultimately not performed: acute inpatient care management, however at this time, the patient is most appropriate for outpatient management.    Decision tools and prescription drugs considered including, but not limited to: Percocet.    I reviewed prescription monitoring program for patient's narcotic use before prescribing a scheduled drug.The patient will not drink alcohol nor drive with prescribed medications.The patient will return for new or worsening symptoms and is stable at the time of discharge.    The patient is referred to a primary physician for blood pressure management, diabetic screening, and for all other preventative health concerns.        DISPOSITION:  Patient will be discharged home in stable condition.    FOLLOW UP:  No follow-up provider specified.    OUTPATIENT MEDICATIONS:  New Prescriptions    No medications on file         FINAL DIAGNOSIS  1. Closed trimalleolar fracture of right ankle, initial encounter Acute   2. Closed nondisplaced fracture of navicular bone of left foot, initial encounter Acute   3. Closed trimalleolar fracture of left ankle, initial encounter    Splint placement     Chidi CAMILO (Curtis), am scribing for, and in the presence of, Marvin Paulson D.O..    Electronically signed by: Chidi Gold), 7/20/2025    Marvin CAMILO D.O. personally performed the services described in this documentation, as scribed by Chidi Stoddard in my presence, and it is both accurate and complete.      The note accurately reflects work and decisions made by me.  Marvin Paulson D.O.   7/21/2025  1:54 AM          [1]   Past Medical History:  Diagnosis Date    Patient denies medical problems    [2]   Past Surgical History:  Procedure Laterality Date    NO PERTINENT PAST SURGICAL HISTORY     [3] No Known Allergies

## 2025-07-21 NOTE — ASSESSMENT & PLAN NOTE
- Due to mechanical ground-level fall.  Imaging showed trimalleolar right ankle fracture along with left anterior superior navicular fracture.  - now s/p ORIF of the right trimalleolar after fracture with fixation of the medial and lateral malleoli without fixation of the posterior lip on 7/23/2025  - Continue vitamin D replacement for vitamin D insufficiency.  - continue pain control with Tylenol, oral oxycodone and IV Dilaudid. Monitor for narcotic side effects particularly respiratory depression, AMS, and constipation.    -Monitor for ABLA postoperatively.  Check hemoglobin daily.  - Continue pharmacologic DVT prophylaxis while in the hospital.  Continue Lovenox SQ. Patient had low-risk below the knee procedure, with no prior history of VTE. Pt is Nonweightbearing on the affected extremity. Will need on-going DVT prophylaxis on discharge, can switch to ASA 81mg BID x 4 weeks on discharge.  -PT/OT recommend postacute placement.  MADISON/ISHA looked at SNF placement but no accepting facilities due to insurance.  She will have limited support at home when  goes back to work.  They also live in a trailer which is not accessible to wheelchair.  Discharge options include rehab in place vs leased bed SNF placement.  I discussed discharge planning with MADISON/ISHA.

## 2025-07-21 NOTE — HOSPITAL COURSE
Patient is a 45-year-old female presents with mechanical ground-level fall with subsequent bilateral lower extremity pain.    In the ED, Right ankle x-ray with trimalleolar fracture.  Left ankle x-ray with anterior superior navicular fracture.  Left knee x-ray without acute abnormality.  Right knee x-ray without acute abnormality.  Right tib-fib x-ray with trimalleolar fracture.  Left tib-fib x-ray without acute abnormality.

## 2025-07-21 NOTE — CARE PLAN
Problem: Knowledge Deficit - Standard  Goal: Patient and family/care givers will demonstrate understanding of plan of care, disease process/condition, diagnostic tests and medications  Outcome: Progressing     Problem: Pain - Standard  Goal: Alleviation of pain or a reduction in pain to the patient’s comfort goal  Outcome: Progressing       The patient is Stable - Low risk of patient condition declining or worsening    Shift Goals  Clinical Goals: Pain control, NPO  Patient Goals: Pain control, updates  Family Goals: Updates    Progress made toward(s) clinical / shift goals:  Taught pt 0-10 pain scale and non-pharmacological method of pain management, encouraged to verbalize when in pain. Administered PRN pain meds as needed. Pt NPO pending ortho consult, verbalizes understanding.     Patient is not progressing towards the following goals:

## 2025-07-21 NOTE — PROGRESS NOTES
Patient getting bumped to tomorrow for surgery due to multiple high acuity trauma arrivals  To OR tomororw for ORIF  NPO after midnight

## 2025-07-21 NOTE — PROGRESS NOTES
[facility] Medicine Daily Progress Note    Date of Service  7/21/2025    Chief Complaint  Cinthia Albarado is a 45 y.o. female admitted 7/20/2025 with R ankle fracture    Hospital Course  Patient is a 45-year-old female presents with mechanical ground-level fall with subsequent bilateral lower extremity pain.    In the ED, Right ankle x-ray with trimalleolar fracture.  Left ankle x-ray with anterior superior navicular fracture.  Left knee x-ray without acute abnormality.  Right knee x-ray without acute abnormality.  Right tib-fib x-ray with trimalleolar fracture.  Left tib-fib x-ray without acute abnormality.    Interval Problem Update    Orthopedics consulted, currently pending formal evaluation and definitive management.  At bedside patient reporting right ankle pain and left ankle pain.  Patient's right ankle currently has cast in place and left ankle with orthopedic boot.  Bilateral extremities feel warm and well-perfused with intact sensation.  She is otherwise afebrile, room air, other vital signs stable.        I have discussed this patient's plan of care and discharge plan at IDT rounds today with Case Management, Nursing, Nursing leadership, and other members of the IDT team.    Consultants/Specialty  orthopedics    Code Status  Full Code    Disposition  The patient is not medically cleared for discharge to home or a post-acute facility.      I have placed the appropriate orders for post-discharge needs.    Review of Systems  Review of Systems   Constitutional: Negative.    HENT: Negative.     Eyes: Negative.    Respiratory: Negative.     Cardiovascular: Negative.    Gastrointestinal: Negative.    Genitourinary: Negative.    Musculoskeletal:         Bilateral ankle pain, right worse than left   Skin: Negative.         Physical Exam  Temp:  [36.1 °C (96.9 °F)-36.4 °C (97.6 °F)] 36.4 °C (97.6 °F)  Pulse:  [72-85] 78  Resp:  [16-18] 17  BP: (102-122)/(56-94) 122/94  SpO2:  [95 %-98 %] 95  %    Physical Exam  Constitutional:       Appearance: Normal appearance.   HENT:      Head: Normocephalic and atraumatic.      Mouth/Throat:      Mouth: Mucous membranes are moist.      Pharynx: Oropharynx is clear.   Eyes:      Pupils: Pupils are equal, round, and reactive to light.   Pulmonary:      Effort: Pulmonary effort is normal.      Breath sounds: Normal breath sounds.   Abdominal:      Palpations: Abdomen is soft.   Musculoskeletal:      Comments: Left ankle boot in place  R ankle cast in place   Skin:     General: Skin is warm and dry.      Capillary Refill: Capillary refill takes less than 2 seconds.   Neurological:      Mental Status: She is alert and oriented to person, place, and time.         Fluids  No intake or output data in the 24 hours ending 07/21/25 1013     Laboratory  Recent Labs     07/21/25  0337   WBC 10.2   RBC 4.28   HEMOGLOBIN 13.2   HEMATOCRIT 38.1   MCV 89.0   MCH 30.8   MCHC 34.6   RDW 44.7   PLATELETCT 253   MPV 9.7     Recent Labs     07/21/25  0337   SODIUM 136   POTASSIUM 4.2   CHLORIDE 105   CO2 19*   GLUCOSE 115*   BUN 10   CREATININE 0.67   CALCIUM 9.0     Recent Labs     07/21/25  0337   INR 0.97               Imaging      Assessment/Plan  * Closed trimalleolar fracture of right ankle, initial encounter- (present on admission)  Assessment & Plan  Right ankle x-ray with trimalleolar fracture.  Right knee x-ray without acute abnormality.  Right tib-fib x-ray with trimalleolar fracture.    - Orthopedic surgery consulted, Dr. Fraser  - N.p.o.  - Multimodal pain management  - Pending CT right ankle without contrast    Normal anion gap metabolic acidosis- (present on admission)  Assessment & Plan  Bicarb 19 and anion gap 12.    - Daily BMP    Fall- (present on admission)  Assessment & Plan  Mechanical ground-level fall off of nonmoving trailer without head strike or loss of consciousness.    - PT/OT when appropriate    Closed nondisplaced fracture of navicular bone of left foot-  (present on admission)  Assessment & Plan  Left ankle x-ray with anterior superior navicular fracture.  Left knee x-ray without acute abnormality.  Left tib-fib x-ray without acute abnormality.    - Orthopedic surgery consulted,, Dr. Fraser  - N.p.o.  - Multimodal pain management  - Pending CT left foot without contrast         VTE prophylaxis:   SCDs/TEDs      I have performed a physical exam and reviewed and updated ROS and Plan today (7/21/2025). In review of yesterday's note (7/20/2025), there are no changes except as documented above.

## 2025-07-21 NOTE — PROGRESS NOTES
0606 AM- pateint arrived from ED via gurney assisted by transporter and patient's . Patient is A/O x4, respiration is even and unlabored. RLE with splint dressing in place-CDI. LLE with CAM boot in place . Noted abrasions to bilateral knee. Pain score to bilateral foot 8/10. Oxycodone 5 mg PRN given. Patient is [place] speaking, wears contact lens. Patient on NPO status.

## 2025-07-21 NOTE — PROGRESS NOTES
"ED Observation Progress Note    Date of Service: 07/21/25    Interval History and Interventions  Patient presenting after a fall.  Imaging showing left navicular fracture and right trimalleolar fracture.  Orthopedic boot on left and Ortho-Glass on right foot.  There was attempt by my colleague for outpatient disposition.  CT imaging was ordered at the recommendation or orthopedics.  This was going to allow for further ability to assess how much weightbearing would be appropriate for the right foot.  Unfortunately despite this study pending that she has no ability to ambulate secondary to pain.  For this reason I will the patient brought into the hospital for continuation of care.  Orthopedics can be reconsulted in the morning.    Physical Exam  /67   Pulse 72   Temp 36.2 °C (97.2 °F) (Temporal)   Resp 16   Ht 1.676 m (5' 6\")   Wt 77.1 kg (170 lb)   SpO2 95%   BMI 27.44 kg/m² .    Constitutional: Awake and alert. Nontoxic  HENT:  Grossly normal  Eyes: Grossly normal  Neck: Normal range of motion  Cardiovascular: Normal heart rate   Thorax & Lungs: No respiratory distress  Abdomen: Nontender  Skin:  No pathologic rash.   Extremities: Well perfused.  Orthopedic adjuncts in place to bilateral legs/feet/ankle  Psychiatric: Affect normal    Labs  Results for orders placed or performed during the hospital encounter of 07/20/25   HCG Qual Serum    Collection Time: 07/21/25  3:37 AM   Result Value Ref Range    Beta-Hcg Qualitative Serum Negative Negative   CBC WITH DIFFERENTIAL    Collection Time: 07/21/25  3:37 AM   Result Value Ref Range    WBC 10.2 4.8 - 10.8 K/uL    RBC 4.28 4.20 - 5.40 M/uL    Hemoglobin 13.2 12.0 - 16.0 g/dL    Hematocrit 38.1 37.0 - 47.0 %    MCV 89.0 81.4 - 97.8 fL    MCH 30.8 27.0 - 33.0 pg    MCHC 34.6 32.2 - 35.5 g/dL    RDW 44.7 35.9 - 50.0 fL    Platelet Count 253 164 - 446 K/uL    MPV 9.7 9.0 - 12.9 fL    Neutrophils-Polys 59.50 44.00 - 72.00 %    Lymphocytes 31.60 22.00 - 41.00 % "    Monocytes 6.70 0.00 - 13.40 %    Eosinophils 0.80 0.00 - 6.90 %    Basophils 0.50 0.00 - 1.80 %    Immature Granulocytes 0.90 0.00 - 0.90 %    Nucleated RBC 0.00 0.00 - 0.20 /100 WBC    Neutrophils (Absolute) 6.06 1.82 - 7.42 K/uL    Lymphs (Absolute) 3.21 1.00 - 4.80 K/uL    Monos (Absolute) 0.68 0.00 - 0.85 K/uL    Eos (Absolute) 0.08 0.00 - 0.51 K/uL    Baso (Absolute) 0.05 0.00 - 0.12 K/uL    Immature Granulocytes (abs) 0.09 0.00 - 0.11 K/uL    NRBC (Absolute) 0.00 K/uL   BASIC METABOLIC PANEL    Collection Time: 07/21/25  3:37 AM   Result Value Ref Range    Sodium 136 135 - 145 mmol/L    Potassium 4.2 3.6 - 5.5 mmol/L    Chloride 105 96 - 112 mmol/L    Co2 19 (L) 20 - 33 mmol/L    Glucose 115 (H) 65 - 99 mg/dL    Bun 10 8 - 22 mg/dL    Creatinine 0.67 0.50 - 1.40 mg/dL    Calcium 9.0 8.5 - 10.5 mg/dL    Anion Gap 12.0 7.0 - 16.0   ESTIMATED GFR    Collection Time: 07/21/25  3:37 AM   Result Value Ref Range    GFR (CKD-EPI) 110 >60 mL/min/1.73 m 2       Radiology  DX-ANKLE 3+ VIEWS LEFT   Final Result         1.  Anterior superior navicular fracture         DX-ANKLE 3+ VIEWS RIGHT   Final Result         1.  Trimalleolar fracture         DX-KNEE 3 VIEWS RIGHT   Final Result         1.  No acute traumatic bony injury.      DX-TIBIA AND FIBULA LEFT   Final Result         1.  No acute traumatic bony injury.      DX-TIBIA AND FIBULA RIGHT   Final Result         1.  Trimalleolar fracture      DX-KNEE 3 VIEWS LEFT   Final Result         1.  No acute traumatic bony injury.      CT-ANKLE W/O PLUS RECONS RIGHT    (Results Pending)   CT-FOOT W/O PLUS RECONS LEFT    (Results Pending)       Problem List  1.  Trimalleolar fracture  2.  Navicular fracture    Electronically signed by: Thong Card M.D., 7/21/2025 5:21 AM

## 2025-07-21 NOTE — PROGRESS NOTES
4 Eyes Skin Assessment Completed by David RN and JANAE Moore.    Skin assessment is primarily focused on high risk bony prominences. Pay special attention to skin beneath and around medical devices, high risk bony prominences, skin to skin areas and areas where the patient lacks sensation to feel pain and areas where the patient previously had breakdown.     Head (Occipital):  WDL   Ears (Under Medical Devices): WDL   Nose (Under Medical Devices): WDL   Mouth:  WDL   Neck: WDL   Breast/Chest:  WDL   Shoulder Blades:  WDL   Spine:   WDL   (R) Arm/Elbow/Hand: WDL   (L) Arm/Elbow/Hand: WDL   Abdomen: WDL   Pannus/Groin:  WDL   Sacrum/Coccyx:   WDL   (R) Ischial Tuberosity (Sit Bones):  WDL   (L) Ischial Tuberosity (Sit Bones):  WDL   (R) Leg:  Abrasion and Red to knee   (L) Leg:  Abrasion and Red to knee   (R) Heel:  Dressing in place   (R)- Foot/Toe:  Dressing in place   (L) Heel: Boot in place   (L) Foot/Toe:  Boot in place       DEVICES IN USE:   Respiratory Devices:  NA, patient on room air, Nasal cannula, and Pulse ox  Feeding Devices:  N/A   Lines & BP Monitoring Devices:  Peripheral IV    Orthopedic Devices:  Splint, Boot  Miscellaneous Devices:  Purewick    PROTOCOL INTERVENTIONS:   Standard/Trauma Bed:  Already in place    WOUND PHOTOS:   N/A no wounds identified    WOUND CONSULT:   N/A, no advanced wound care needs identified

## 2025-07-21 NOTE — PROGRESS NOTES
Virtual Nurse   Virtual nurse portion of admit profile completed.   Patient stating 10/10 pain in BLE and requesting prn pain medication. Bedside RN notified via voalte.

## 2025-07-21 NOTE — DISCHARGE INSTRUCTIONS
Call the [facility] in the morning to obtain an appointment in the next couple days. Also elevate the feet to decrease swelling which will decrease pain. Ice can be used or the prescribed medication. Stay hydrated. Get lots of rest. DO not bear weight on the right lower extremity.

## 2025-07-21 NOTE — ED TRIAGE NOTES
Chief Complaint   Patient presents with    T-5000 GLF     Pt said she fell from the trailer  (not moving) since 20 minutes ago  + she hit her both legs - causing pain    Denied any head injury, loss of consciousness or taking blood thinners     Pain:  10/10  Ambulatory:  on wheelchair  Alert and Oriented: x 4  Oxygen Treatment: No    Pt came in to triage for the above complaints.     Pt is speaking in full sentences, follows commands and responds appropriately to questions.     Respirations are even and unlabored.    Pt placed in lobby. Pt educated on triage process.     Pt encouraged to inform staff for any changes in condition or if needs help while waiting to be room in.      Vitals:    07/20/25 2233   BP: 111/76   Pulse: 82   Resp: 18   Temp: 36.2 °C (97.2 °F)   SpO2: 98%

## 2025-07-21 NOTE — H&P
Hospital Medicine History & Physical Note    Date of Service  7/21/2025    Primary Care Physician  Mohsen Tamasaby, M.D.    Consultants  orthopedics    Specialist Names: Dr. Fraser    Code Status  Full Code    Chief Complaint  Chief Complaint   Patient presents with    T-5000 GLF     Pt said she fell from the trailer  (not moving) since 20 minutes ago  + she hit her both legs - causing pain    Denied any head injury, loss of consciousness or taking blood thinners       History of Presenting Illness  Patient is a 45-year-old female presents with mechanical ground-level fall with subsequent bilateral lower extremity pain.    Patient states that they were walking outside to throughout the trash when they missed a step and fell.  Denies head strike or loss of consciousness.  States when they stood up they had significant pain in both of their lower extremities.    VSS.  Received Percocet 5-325 mg p.o. x 2, morphine 4 mg IV x 1.  CBC unremarkable.  Potassium 4.2, bicarb 19, anion gap 12, BUN 10, creatinine 0.67.  Beta-hCG negative.  Right ankle x-ray with trimalleolar fracture.  Left ankle x-ray with anterior superior navicular fracture.  Left knee x-ray without acute abnormality.  Right knee x-ray without acute abnormality.  Right tib-fib x-ray with trimalleolar fracture.  Left tib-fib x-ray without acute abnormality.    I discussed the plan of care with patient.    Review of Systems  Review of Systems   Constitutional:  Negative for chills and fever.   HENT:  Negative for ear pain.    Eyes:  Negative for blurred vision, double vision and pain.   Respiratory:  Negative for cough, shortness of breath and wheezing.    Cardiovascular:  Negative for chest pain, palpitations and leg swelling.   Gastrointestinal:  Negative for abdominal pain, blood in stool, constipation, diarrhea, melena, nausea and vomiting.   Genitourinary:  Negative for dysuria, frequency and hematuria.   Musculoskeletal:  Positive for joint pain  (Bilateral ankle). Negative for back pain and neck pain.   Neurological:  Negative for dizziness and headaches.       Past Medical History   has a past medical history of Patient denies medical problems.    Surgical History   has a past surgical history that includes no pertinent past surgical history.     Family History  History reviewed. No pertinent family history.     Family history reviewed with patient. There is no family history that is pertinent to the chief complaint.     Social History   reports that she has never smoked. She has never used smokeless tobacco. She reports that she does not drink alcohol and does not use drugs.    Allergies  Allergies[1]    Medications  None       Physical Exam  Temp:  [36.2 °C (97.2 °F)] 36.2 °C (97.2 °F)  Pulse:  [72-85] 72  Resp:  [16-18] 16  BP: (108-122)/(56-76) 108/67  SpO2:  [95 %-98 %] 95 %  Blood Pressure: 108/67   Temperature: 36.2 °C (97.2 °F)   Pulse: 72   Respiration: 16   Pulse Oximetry: 95 %       Physical Exam  Vitals reviewed.   Constitutional:       General: She is not in acute distress.     Appearance: Normal appearance. She is not ill-appearing, toxic-appearing or diaphoretic.   HENT:      Head: Normocephalic and atraumatic.      Mouth/Throat:      Mouth: Mucous membranes are moist.      Pharynx: No oropharyngeal exudate or posterior oropharyngeal erythema.   Eyes:      General: No scleral icterus.     Extraocular Movements: Extraocular movements intact.      Conjunctiva/sclera: Conjunctivae normal.   Cardiovascular:      Rate and Rhythm: Normal rate and regular rhythm.      Heart sounds: Normal heart sounds. No murmur heard.     No friction rub. No gallop.   Pulmonary:      Effort: Pulmonary effort is normal. No respiratory distress.      Breath sounds: Normal breath sounds. No stridor. No wheezing, rhonchi or rales.   Abdominal:      General: Abdomen is flat. There is no distension.      Palpations: Abdomen is soft. There is no mass.      Tenderness:  "There is no abdominal tenderness. There is no guarding or rebound.      Hernia: No hernia is present.   Musculoskeletal:         General: No swelling or tenderness. Normal range of motion.      Cervical back: Neck supple. No rigidity.      Right lower leg: No edema.      Left lower leg: No edema.      Comments: Right lower extremity in dressing, neurovascularly intact  Left lower extremity in boot, neurovascularly intact   Lymphadenopathy:      Cervical: No cervical adenopathy.   Skin:     General: Skin is warm and dry.      Coloration: Skin is not jaundiced.   Neurological:      Mental Status: She is alert and oriented to person, place, and time. Mental status is at baseline.      Cranial Nerves: No cranial nerve deficit.         Laboratory:  Recent Labs     07/21/25  0337   WBC 10.2   RBC 4.28   HEMOGLOBIN 13.2   HEMATOCRIT 38.1   MCV 89.0   MCH 30.8   MCHC 34.6   RDW 44.7   PLATELETCT 253   MPV 9.7     Recent Labs     07/21/25  0337   SODIUM 136   POTASSIUM 4.2   CHLORIDE 105   CO2 19*   GLUCOSE 115*   BUN 10   CREATININE 0.67   CALCIUM 9.0     Recent Labs     07/21/25  0337   GLUCOSE 115*     Recent Labs     07/21/25  0337   INR 0.97     No results for input(s): \"NTPROBNP\" in the last 72 hours.      No results for input(s): \"TROPONINT\" in the last 72 hours.    Imaging:  DX-ANKLE 3+ VIEWS LEFT   Final Result         1.  Anterior superior navicular fracture         DX-ANKLE 3+ VIEWS RIGHT   Final Result         1.  Trimalleolar fracture         DX-KNEE 3 VIEWS RIGHT   Final Result         1.  No acute traumatic bony injury.      DX-TIBIA AND FIBULA LEFT   Final Result         1.  No acute traumatic bony injury.      DX-TIBIA AND FIBULA RIGHT   Final Result         1.  Trimalleolar fracture      DX-KNEE 3 VIEWS LEFT   Final Result         1.  No acute traumatic bony injury.      CT-ANKLE W/O PLUS RECONS RIGHT    (Results Pending)   CT-FOOT W/O PLUS RECONS LEFT    (Results Pending)       X-Ray:  I have personally " reviewed the images and compared with prior images.    Assessment/Plan:  Justification for Admission Status  I anticipate this patient will require at least two midnights for appropriate medical management, necessitating inpatient admission because right trimalleolar fracture and the left navicular fracture requiring orthopedic consultation    Patient will need a Med/Surg bed on ORTHOPEDICS service .  The need is secondary to right trimalleolar fracture and the left navicular fracture requiring orthopedic consultation.    * Closed trimalleolar fracture of right ankle, initial encounter- (present on admission)  Assessment & Plan  Right ankle x-ray with trimalleolar fracture.  Right knee x-ray without acute abnormality.  Right tib-fib x-ray with trimalleolar fracture.    - Orthopedic surgery consulted in the ED, Dr. Fraser  - N.p.o.  - Multimodal pain management  - Pending CT right ankle without contrast    Closed nondisplaced fracture of navicular bone of left foot- (present on admission)  Assessment & Plan  Left ankle x-ray with anterior superior navicular fracture.  Left knee x-ray without acute abnormality.  Left tib-fib x-ray without acute abnormality.    - Orthopedic surgery consulted in the ED, Dr. Fraser  - N.p.o.  - Multimodal pain management  - Pending CT left foot without contrast    Normal anion gap metabolic acidosis- (present on admission)  Assessment & Plan  Bicarb 19 and anion gap 12.    - Daily BMP    Fall- (present on admission)  Assessment & Plan  Mechanical ground-level fall off of nonmoving trailer without head strike or loss of consciousness.    - PT/OT when appropriate        VTE prophylaxis: SCDs/TEDs         [1] No Known Allergies

## 2025-07-21 NOTE — ED NOTES
Med rec complete per patient via .  Allergies reviewed.    Patient denies any outpatient anti-MICROBIAL or ANTICOAGULANTS in the last 30 days.      Manda Chase, PhT

## 2025-07-22 ENCOUNTER — APPOINTMENT (OUTPATIENT)
Dept: RADIOLOGY | Facility: MEDICAL CENTER | Age: 45
DRG: 493 | End: 2025-07-22
Attending: STUDENT IN AN ORGANIZED HEALTH CARE EDUCATION/TRAINING PROGRAM
Payer: COMMERCIAL

## 2025-07-22 ENCOUNTER — ANESTHESIA (OUTPATIENT)
Dept: SURGERY | Facility: MEDICAL CENTER | Age: 45
DRG: 493 | End: 2025-07-22
Payer: COMMERCIAL

## 2025-07-22 ASSESSMENT — COGNITIVE AND FUNCTIONAL STATUS - GENERAL
HELP NEEDED FOR BATHING: A LITTLE
TOILETING: A LOT
DRESSING REGULAR UPPER BODY CLOTHING: A LOT
WALKING IN HOSPITAL ROOM: A LOT
STANDING UP FROM CHAIR USING ARMS: A LOT
MOVING FROM LYING ON BACK TO SITTING ON SIDE OF FLAT BED: A LOT
DAILY ACTIVITIY SCORE: 17
MOBILITY SCORE: 12
MOVING TO AND FROM BED TO CHAIR: A LOT
DRESSING REGULAR LOWER BODY CLOTHING: A LOT
TURNING FROM BACK TO SIDE WHILE IN FLAT BAD: A LOT
CLIMB 3 TO 5 STEPS WITH RAILING: A LOT
SUGGESTED CMS G CODE MODIFIER MOBILITY: CL
SUGGESTED CMS G CODE MODIFIER DAILY ACTIVITY: CK

## 2025-07-22 ASSESSMENT — PAIN DESCRIPTION - PAIN TYPE
TYPE: ACUTE PAIN

## 2025-07-22 NOTE — THERAPY
Physical Therapy Contact Note    Patient Name: Cinthia Albarado  Age:  45 y.o., Sex:  female  Medical Record #: [id number]  Today's Date: 7/22/2025    PT consult received, pt is pending ORIF of R ankle fx today. Will hold PT eval and f/u post-op as able/appropriate.

## 2025-07-22 NOTE — CARE PLAN
The patient is Stable - Low risk of patient condition declining or worsening    Shift Goals  Clinical Goals: Pain control, rest  Patient Goals: POC updates, pain control  Family Goals: Updates    Progress made toward(s) clinical / shift goals:      Problem: Knowledge Deficit - Standard  Goal: Patient and family/care givers will demonstrate understanding of plan of care, disease process/condition, diagnostic tests and medications  Outcome: Progressing  Note: Remains NPO pending ORIF R ankle this evening.      Problem: Pain - Standard  Goal: Alleviation of pain or a reduction in pain to the patient’s comfort goal  Outcome: Progressing  Note: Cont'd nino tylenol, breakthrough relief with PRN oxy and dilaudid.

## 2025-07-22 NOTE — PROGRESS NOTES
Patient kept NPO since midnight, food and water removed from the bedside table, Patient and  at bedside verbalized understanding.

## 2025-07-22 NOTE — CARE PLAN
The patient is Stable - Low risk of patient condition declining or worsening    Shift Goals  Clinical Goals: pain control, NPO midnight for surgery tommorrow, saefty, mobility  Patient Goals: pain control, surgery updates  Family Goals: surgery updates    Progress made toward(s) clinical / shift goals:  yes      Problem: Knowledge Deficit - Standard  Goal: Patient and family/care givers will demonstrate understanding of plan of care, disease process/condition, diagnostic tests and medications  Description: Target End Date:  1-3 days or as soon as patient condition allows    Document in Patient Education    1.  Patient and family/caregiver oriented to unit, equipment, visitation policy and means for communicating concern  2.  Complete/review Learning Assessment  3.  Assess knowledge level of disease process/condition, treatment plan, diagnostic tests and medications  4.  Explain disease process/condition, treatment plan, diagnostic tests and medications  Outcome: Progressing     Problem: Skin Integrity  Goal: Skin integrity is maintained or improved  Description: Target End Date:  Prior to discharge or change in level of care    Document interventions on Skin Risk/Elliot flowsheet groups and corresponding LDA    1.  Assess and monitor skin integrity, appearance and/or temperature  2.  Assess risk factors for impaired skin integrity and/or pressures ulcers  3.  Implement precautions to protect skin integrity in collaboration with interdisciplinary team  4.  Implement pressure ulcer prevention protocol if at risk for skin breakdown  5.  Confirm wound care consult if at risk for skin breakdown  6.  Ensure patient use of pressure relieving devices  (Low air loss bed, waffle overlay, heel protectors, ROHO cushion, etc)  Outcome: Progressing     Problem: Pain - Standard  Goal: Alleviation of pain or a reduction in pain to the patient’s comfort goal  Description: Target End Date:  Prior to discharge or change in level of  care    Document on Vitals flowsheet    1.  Document pain using the appropriate pain scale per order or unit policy  2.  Educate and implement non-pharmacologic comfort measures (i.e. relaxation, distraction, massage, cold/heat therapy, etc.)  3.  Pain management medications as ordered  4.  Reassess pain after pain med administration per policy  5.  If opiods administered assess patient's response to pain medication is appropriate per POSS sedation scale  6.  Follow pain management plan developed in collaboration with patient and interdisciplinary team (including palliative care or pain specialists if applicable)  Outcome: Progressing

## 2025-07-22 NOTE — PROGRESS NOTES
Hospital Medicine Daily Progress Note    Date of Service  7/22/2025    Chief Complaint  R ankle fracture    Hospital Course  Cinthia Albarado is a 45 y.o. female without much past medical history, admitted 7/20/2025 with mechanical ground-level fall with subsequent bilateral lower extremity pain.  On evaluation, she was found to have trimalleolar right ankle fracture along with left anterior superior navicular fracture.  Bilateral knee x-rays did not show any acute abnormalities.  Otherwise labs were unremarkable.  Orthopedics was consulted recommended surgical repair.  Patient was placed on analgesics.    Interval Problem Update  7/22/2025 - I reviewed the patient's chart. There were no significant overnight events. Remains hemodynamically stable and afebrile. Stable on RA.  She is scheduled for ORIF of the right ankle tonight.     > I have personally seen and examined the patient today.  States pain is reasonable.  No chest pain or shortness of breath.  No nausea or vomiting.  No GI complaints.    I personally reviewed all lab results mentioned above. Prior medical records from this institution and outside facilities were independently reviewed as noted. I also personally reviewed all ER physician and consultant recommendations and plans as documented above. History was independently obtained by myself. I have discussed this patient's plan of care and discharge plan at IDT rounds today with Case Management, Nursing, Nursing leadership, and other members of the IDT team.    Consultants/Specialty  orthopedics    Code Status  Full Code    Disposition  The patient is not medically cleared for discharge to home or a post-acute facility.      Discharge plan TBD.  PT/OT evaluation postoperatively.    I have placed the appropriate orders for post-discharge needs.    Review of Systems  ROS     Pertinent positives/negatives as mentioned above.     A complete review of systems was personally done by me. All  other systems were negative.       Physical Exam  Temp:  [36.2 °C (97.2 °F)-36.6 °C (97.9 °F)] 36.3 °C (97.3 °F)  Pulse:  [75-85] 81  Resp:  [16-18] 18  BP: (106-123)/(59-73) 116/63  SpO2:  [93 %-96 %] 94 %    Physical Exam  Constitutional:       Appearance: Normal appearance.   HENT:      Head: Normocephalic and atraumatic.      Right Ear: External ear normal.      Left Ear: External ear normal.      Mouth/Throat:      Mouth: Mucous membranes are moist.      Pharynx: Oropharynx is clear.   Eyes:      Pupils: Pupils are equal, round, and reactive to light.   Cardiovascular:      Rate and Rhythm: Normal rate and regular rhythm.   Pulmonary:      Effort: Pulmonary effort is normal.      Breath sounds: Normal breath sounds.   Abdominal:      Palpations: Abdomen is soft.   Musculoskeletal:      Comments: Left ankle boot in place  R ankle cast in place   Skin:     General: Skin is warm and dry.      Capillary Refill: Capillary refill takes less than 2 seconds.   Neurological:      General: No focal deficit present.      Mental Status: She is alert and oriented to person, place, and time.   Psychiatric:         Mood and Affect: Mood normal.         Behavior: Behavior normal.         Thought Content: Thought content normal.         Fluids    Intake/Output Summary (Last 24 hours) at 7/22/2025 1359  Last data filed at 7/22/2025 0800  Gross per 24 hour   Intake 0 ml   Output 400 ml   Net -400 ml        Laboratory  Recent Labs     07/21/25  0337 07/22/25  0908   WBC 10.2 9.0   RBC 4.28 4.27   HEMOGLOBIN 13.2 12.9   HEMATOCRIT 38.1 39.2   MCV 89.0 91.8   MCH 30.8 30.2   MCHC 34.6 32.9   RDW 44.7 46.4   PLATELETCT 253 228   MPV 9.7 9.5     Recent Labs     07/21/25  0337 07/22/25  0908   SODIUM 136 138   POTASSIUM 4.2 4.9   CHLORIDE 105 106   CO2 19* 24   GLUCOSE 115* 102*   BUN 10 9   CREATININE 0.67 0.64   CALCIUM 9.0 9.2     Recent Labs     07/21/25  0337   INR 0.97               Imaging      Assessment/Plan  * Closed  trimalleolar fracture of right ankle, initial encounter- (present on admission)  Assessment & Plan  - Due to mechanical ground-level fall.  Imaging showed trimalleolar right ankle fracture along with left anterior superior navicular fracture.  - Orthopedics on board, plan for ORIF of the right ankle tonight 7/22/25.  - Check Vit D level.   - continue pain control with Tylenol, oral oxycodone and IV Dilaudid. Monitor for narcotic side effects particularly respiratory depression, AMS, and constipation.    -Monitor for ABLA postoperatively.  Check hemoglobin daily.  - Continue pharmacologic DVT prophylaxis while in the hospital.  Start Lovenox SQ on POD#1. Patient will have low-risk below the knee procedure, with no prior history of VTE. Pt is anticipated to be Nonweightbearing on the affected extremity. Will need on-going DVT prophylaxis on discharge.  - PT/OT eval postoperatively.      Closed nondisplaced fracture of navicular bone of left foot- (present on admission)  Assessment & Plan  - Management as above.    Normal anion gap metabolic acidosis- (present on admission)  Assessment & Plan  - Resolved.  -Continue to monitor.  BMP in the morning.    Fall- (present on admission)  Assessment & Plan  - PT/OT evaluation postop.         VTE prophylaxis: Lovenox SQ    My total time spent caring for the patient on the day of the encounter was 52 minutes. This does not include time spent on separately billable procedures/tests.

## 2025-07-22 NOTE — THERAPY
Occupational Therapy Contact Note    Patient Name: Cinthia Albarado  Age:  45 y.o., Sex:  female  Medical Record #: [id number]  Today's Date: 7/22/2025 07/22/25 0744   Interdisciplinary Plan of Care Collaboration   Collaboration Comments ORIF of ankle fx today, will hold OT evaluation today

## 2025-07-22 NOTE — PROGRESS NOTES
Virtual Nurse rounding complete.    Round Needs: Pain Rating 10/10 and Notified of Patient Needs: Primary RN.

## 2025-07-23 ENCOUNTER — SURGERY (OUTPATIENT)
Age: 45
End: 2025-07-23
Payer: COMMERCIAL

## 2025-07-23 ENCOUNTER — APPOINTMENT (OUTPATIENT)
Dept: RADIOLOGY | Facility: MEDICAL CENTER | Age: 45
DRG: 493 | End: 2025-07-23
Attending: STUDENT IN AN ORGANIZED HEALTH CARE EDUCATION/TRAINING PROGRAM
Payer: COMMERCIAL

## 2025-07-23 PROBLEM — E55.9 VITAMIN D INSUFFICIENCY: Status: ACTIVE | Noted: 2025-07-23

## 2025-07-23 PROCEDURE — 700105 HCHG RX REV CODE 258: Performed by: STUDENT IN AN ORGANIZED HEALTH CARE EDUCATION/TRAINING PROGRAM

## 2025-07-23 PROCEDURE — 700111 HCHG RX REV CODE 636 W/ 250 OVERRIDE (IP): Mod: JZ | Performed by: STUDENT IN AN ORGANIZED HEALTH CARE EDUCATION/TRAINING PROGRAM

## 2025-07-23 RX ORDER — SODIUM CHLORIDE, SODIUM LACTATE, POTASSIUM CHLORIDE, CALCIUM CHLORIDE 600; 310; 30; 20 MG/100ML; MG/100ML; MG/100ML; MG/100ML
INJECTION, SOLUTION INTRAVENOUS
Status: DISCONTINUED | OUTPATIENT
Start: 2025-07-23 | End: 2025-07-23 | Stop reason: SURG

## 2025-07-23 RX ORDER — ONDANSETRON 2 MG/ML
INJECTION INTRAMUSCULAR; INTRAVENOUS PRN
Status: DISCONTINUED | OUTPATIENT
Start: 2025-07-23 | End: 2025-07-23 | Stop reason: SURG

## 2025-07-23 RX ORDER — ROPIVACAINE HYDROCHLORIDE 5 MG/ML
INJECTION, SOLUTION EPIDURAL; INFILTRATION; PERINEURAL PRN
Status: DISCONTINUED | OUTPATIENT
Start: 2025-07-23 | End: 2025-07-23 | Stop reason: SURG

## 2025-07-23 RX ORDER — CEFAZOLIN SODIUM 1 G/3ML
INJECTION, POWDER, FOR SOLUTION INTRAMUSCULAR; INTRAVENOUS PRN
Status: DISCONTINUED | OUTPATIENT
Start: 2025-07-23 | End: 2025-07-23 | Stop reason: SURG

## 2025-07-23 RX ORDER — KETOROLAC TROMETHAMINE 15 MG/ML
INJECTION, SOLUTION INTRAMUSCULAR; INTRAVENOUS PRN
Status: DISCONTINUED | OUTPATIENT
Start: 2025-07-23 | End: 2025-07-23 | Stop reason: SURG

## 2025-07-23 RX ORDER — BUPIVACAINE HYDROCHLORIDE 2.5 MG/ML
INJECTION, SOLUTION EPIDURAL; INFILTRATION; INTRACAUDAL; PERINEURAL PRN
Status: DISCONTINUED | OUTPATIENT
Start: 2025-07-23 | End: 2025-07-23 | Stop reason: HOSPADM

## 2025-07-23 RX ORDER — DEXAMETHASONE SODIUM PHOSPHATE 4 MG/ML
INJECTION, SOLUTION INTRA-ARTICULAR; INTRALESIONAL; INTRAMUSCULAR; INTRAVENOUS; SOFT TISSUE PRN
Status: DISCONTINUED | OUTPATIENT
Start: 2025-07-23 | End: 2025-07-23 | Stop reason: SURG

## 2025-07-23 RX ORDER — MIDAZOLAM HYDROCHLORIDE 1 MG/ML
INJECTION INTRAMUSCULAR; INTRAVENOUS PRN
Status: DISCONTINUED | OUTPATIENT
Start: 2025-07-23 | End: 2025-07-23 | Stop reason: SURG

## 2025-07-23 RX ADMIN — SODIUM CHLORIDE, POTASSIUM CHLORIDE, SODIUM LACTATE AND CALCIUM CHLORIDE: 600; 310; 30; 20 INJECTION, SOLUTION INTRAVENOUS at 10:24

## 2025-07-23 RX ADMIN — FENTANYL CITRATE 50 MCG: 50 INJECTION, SOLUTION INTRAMUSCULAR; INTRAVENOUS at 10:33

## 2025-07-23 RX ADMIN — PROPOFOL 200 MG: 10 INJECTION, EMULSION INTRAVENOUS at 10:28

## 2025-07-23 RX ADMIN — MIDAZOLAM HYDROCHLORIDE 2 MG: 1 INJECTION, SOLUTION INTRAMUSCULAR; INTRAVENOUS at 10:01

## 2025-07-23 RX ADMIN — DEXAMETHASONE SODIUM PHOSPHATE 4 MG: 4 INJECTION INTRA-ARTICULAR; INTRALESIONAL; INTRAMUSCULAR; INTRAVENOUS; SOFT TISSUE at 10:33

## 2025-07-23 RX ADMIN — TOBRAMYCIN 1.2 G: 1200 INJECTION, POWDER, FOR SOLUTION INTRAVENOUS at 11:11

## 2025-07-23 RX ADMIN — ONDANSETRON 4 MG: 2 INJECTION INTRAMUSCULAR; INTRAVENOUS at 11:15

## 2025-07-23 RX ADMIN — PROPOFOL 50 MCG/KG/MIN: 10 INJECTION, EMULSION INTRAVENOUS at 10:36

## 2025-07-23 RX ADMIN — ROPIVACAINE HYDROCHLORIDE 30 ML: 5 INJECTION EPIDURAL; INFILTRATION; PERINEURAL at 10:11

## 2025-07-23 RX ADMIN — FENTANYL CITRATE 25 MCG: 50 INJECTION, SOLUTION INTRAMUSCULAR; INTRAVENOUS at 11:15

## 2025-07-23 RX ADMIN — CEFAZOLIN 2 G: 1 INJECTION, POWDER, FOR SOLUTION INTRAMUSCULAR; INTRAVENOUS at 10:24

## 2025-07-23 RX ADMIN — VANCOMYCIN HYDROCHLORIDE 1 G: 500 INJECTION, POWDER, LYOPHILIZED, FOR SOLUTION INTRAVENOUS at 11:11

## 2025-07-23 RX ADMIN — BUPIVACAINE HYDROCHLORIDE 10 ML: 2.5 INJECTION, SOLUTION EPIDURAL; INFILTRATION; INTRACAUDAL at 10:11

## 2025-07-23 RX ADMIN — FENTANYL CITRATE 25 MCG: 50 INJECTION, SOLUTION INTRAMUSCULAR; INTRAVENOUS at 10:28

## 2025-07-23 RX ADMIN — KETOROLAC TROMETHAMINE 15 MG: 15 INJECTION, SOLUTION INTRAMUSCULAR; INTRAVENOUS at 11:15

## 2025-07-23 ASSESSMENT — COGNITIVE AND FUNCTIONAL STATUS - GENERAL
CLIMB 3 TO 5 STEPS WITH RAILING: A LOT
TOILETING: A LOT
DRESSING REGULAR UPPER BODY CLOTHING: A LOT
TURNING FROM BACK TO SIDE WHILE IN FLAT BAD: A LOT
STANDING UP FROM CHAIR USING ARMS: A LOT
WALKING IN HOSPITAL ROOM: A LOT
MOVING FROM LYING ON BACK TO SITTING ON SIDE OF FLAT BED: A LOT
SUGGESTED CMS G CODE MODIFIER MOBILITY: CL
HELP NEEDED FOR BATHING: A LITTLE
SUGGESTED CMS G CODE MODIFIER DAILY ACTIVITY: CK
DRESSING REGULAR LOWER BODY CLOTHING: A LOT
MOBILITY SCORE: 12
DAILY ACTIVITIY SCORE: 17
MOVING TO AND FROM BED TO CHAIR: A LOT

## 2025-07-23 ASSESSMENT — PAIN DESCRIPTION - PAIN TYPE
TYPE: ACUTE PAIN;SURGICAL PAIN
TYPE: ACUTE PAIN

## 2025-07-23 NOTE — ANESTHESIA PREPROCEDURE EVALUATION
Case: [id number] Date/Time: 07/23/25 0955    Procedure: ORIF, ANKLE (Right)    Location: [place] [address] / SURGERY [facility]    Surgeons: Henry Atkins M.D.            Relevant Problems   No relevant active problems       Physical Exam    Airway   Mallampati: II  TM distance: >3 FB  Neck ROM: full       Cardiovascular - normal exam  Rhythm: regular  Rate: normal    (-) murmur     Dental - normal exam           Pulmonary - normal examBreath sounds clear to auscultation     Abdominal    Neurological - normal exam                   Anesthesia Plan    ASA 2       Plan - general and peripheral nerve block     Peripheral nerve block will be post-op pain control  Airway plan will be LMA          Induction: intravenous    Postoperative Plan: Postoperative administration of opioids is intended.    Pertinent diagnostic labs and testing reviewed    Informed Consent:    Anesthetic plan and risks discussed with patient.    Use of blood products discussed with: patient whom consented to blood products.

## 2025-07-23 NOTE — PROGRESS NOTES
Right ankle orif today  Left navicular avulsion fx non op with WBAT      Henry Atkins MD  Orthopedic Trauma Surgery

## 2025-07-23 NOTE — ANESTHESIA PROCEDURE NOTES
Airway    Date/Time: 7/23/2025 10:31 AM    Performed by: Bashir Irving M.D.  Authorized by: Bashir Irving M.D.    Location:  OR  Urgency:  Elective  Indications for Airway Management:  Anesthesia      Spontaneous Ventilation: absent    Sedation Level:  Deep  Preoxygenated: Yes    Mask Difficulty Assessment:  0 - not attempted  Final Airway Type:  Supraglottic airway  Final Supraglottic Airway:  Standard LMA    SGA Size:  4  Number of Attempts at Approach:  1

## 2025-07-23 NOTE — PROGRESS NOTES
Hospital Medicine Daily Progress Note    Date of Service  7/23/2025    Chief Complaint  R ankle fracture    Hospital Course  Cinthia Albarado is a 45 y.o. female without much past medical history, admitted 7/20/2025 with mechanical ground-level fall with subsequent bilateral lower extremity pain.  On evaluation, she was found to have trimalleolar right ankle fracture along with left anterior superior navicular fracture.  Bilateral knee x-rays did not show any acute abnormalities.  Otherwise labs were unremarkable.  Orthopedics was consulted recommended surgical repair.  Patient was placed on analgesics.    Interval Problem Update  7/23/2025 - I reviewed the patient's chart today. Uneventful night. VSS. Afebrile. Saturating well on RA.  Last night's surgery (right ankle ORIF) was postponed until this morning.  Vitamin D level low at 23.    > I have personally seen and examined the patient today.  Has pain on the ankles, right more than the left.  No other complaints.  No GI complaints.  Not short of breath.  No chest pain.   at bedside, discussed plan of care.    I personally reviewed all lab results mentioned above. Prior medical records from this institution and outside facilities were independently reviewed as noted. I also personally reviewed all ER physician and consultant recommendations and plans as documented above. History was independently obtained by myself. I have discussed this patient's plan of care and discharge plan at IDT rounds today with Case Management, Nursing, Nursing leadership, and other members of the IDT team.    Consultants/Specialty  orthopedics    Code Status  Full Code    Disposition  The patient is not medically cleared for discharge to home or a post-acute facility.      Discharge plan TBD.  PT/OT evaluation postoperatively.    I have placed the appropriate orders for post-discharge needs.    Review of Systems  ROS     Pertinent positives/negatives as mentioned  above.     A complete review of systems was personally done by me. All other systems were negative.       Physical Exam  Temp:  [35.9 °C (96.7 °F)-36.5 °C (97.7 °F)] 36.5 °C (97.7 °F)  Pulse:  [73-83] 79  Resp:  [16-18] 18  BP: (103-117)/(66-74) 117/72  SpO2:  [93 %-97 %] 97 %    Physical Exam  Constitutional:       Appearance: Normal appearance.   HENT:      Head: Normocephalic and atraumatic.      Right Ear: External ear normal.      Left Ear: External ear normal.      Mouth/Throat:      Mouth: Mucous membranes are moist.      Pharynx: Oropharynx is clear.   Eyes:      Pupils: Pupils are equal, round, and reactive to light.   Cardiovascular:      Rate and Rhythm: Normal rate and regular rhythm.   Pulmonary:      Effort: Pulmonary effort is normal.      Breath sounds: Normal breath sounds.   Abdominal:      Palpations: Abdomen is soft.   Musculoskeletal:      Comments: Left ankle boot in place  R ankle cast in place   Skin:     General: Skin is warm and dry.      Capillary Refill: Capillary refill takes less than 2 seconds.   Neurological:      General: No focal deficit present.      Mental Status: She is alert and oriented to person, place, and time.   Psychiatric:         Mood and Affect: Mood normal.         Behavior: Behavior normal.         Thought Content: Thought content normal.       I have performed the physical examination today 7/23/2025.  In review of yesterday's note, there are no new changes except as documented above.      Fluids    Intake/Output Summary (Last 24 hours) at 7/23/2025 1003  Last data filed at 7/23/2025 0455  Gross per 24 hour   Intake 480 ml   Output 1250 ml   Net -770 ml        Laboratory  Recent Labs     07/21/25  0337 07/22/25  0908 07/23/25  0053   WBC 10.2 9.0 8.1   RBC 4.28 4.27 4.17*   HEMOGLOBIN 13.2 12.9 12.6   HEMATOCRIT 38.1 39.2 38.2   MCV 89.0 91.8 91.6   MCH 30.8 30.2 30.2   MCHC 34.6 32.9 33.0   RDW 44.7 46.4 46.1   PLATELETCT 253 228 226   MPV 9.7 9.5 9.6     Recent  Labs     07/21/25  0337 07/22/25  0908 07/23/25  0053   SODIUM 136 138 136   POTASSIUM 4.2 4.9 5.0   CHLORIDE 105 106 102   CO2 19* 24 24   GLUCOSE 115* 102* 121*   BUN 10 9 11   CREATININE 0.67 0.64 0.69   CALCIUM 9.0 9.2 9.6     Recent Labs     07/21/25  0337   INR 0.97               Imaging      Assessment/Plan  * Closed trimalleolar fracture of right ankle, initial encounter- (present on admission)  Assessment & Plan  - Due to mechanical ground-level fall.  Imaging showed trimalleolar right ankle fracture along with left anterior superior navicular fracture.  - Orthopedics on board, plan for ORIF of the right ankle today 7/23/25.  - Start vitamin D replacement for vitamin D insufficiency.  - continue pain control with Tylenol, oral oxycodone and IV Dilaudid. Monitor for narcotic side effects particularly respiratory depression, AMS, and constipation.    -Monitor for ABLA postoperatively.  Check hemoglobin daily.  - Continue pharmacologic DVT prophylaxis while in the hospital.  Start Lovenox SQ on POD#1. Patient will have low-risk below the knee procedure, with no prior history of VTE. Pt is anticipated to be Nonweightbearing on the affected extremity. Will need on-going DVT prophylaxis on discharge.  - PT/OT eval postoperatively.      Closed nondisplaced fracture of navicular bone of left foot- (present on admission)  Assessment & Plan  - Management as above.    Vitamin D insufficiency- (present on admission)  Assessment & Plan  - Start cholecalciferol 5000 units daily.    Normal anion gap metabolic acidosis- (present on admission)  Assessment & Plan  - Resolved.  -Continue to monitor.  BMP in the morning.    Fall- (present on admission)  Assessment & Plan  - PT/OT evaluation postop.         VTE prophylaxis: Lovenox SQ    My total time spent caring for the patient on the day of the encounter was 51 minutes. This does not include time spent on separately billable procedures/tests.

## 2025-07-23 NOTE — ANESTHESIA TIME REPORT
Anesthesia Start and Stop Event Times       Date Time Event    7/23/2025 1014 Ready for Procedure     1024 Anesthesia Start     1131 Anesthesia Stop          Responsible Staff  07/23/25      Name Role Begin End    Bashir Irving M.D. Anesth 1024 1131          Overtime Reason:  no overtime (within assigned shift)    Comments:

## 2025-07-23 NOTE — OP REPORT
DATE OF OPERATION: 7/23/2025     PREOPERATIVE DIAGNOSIS: Right trimalleolar ankle fracture    POSTOPERATIVE DIAGNOSIS: Same    PROCEDURE PERFORMED: Open reduction internal fixation right trimalleolar ankle fracture with fixation of the medial and lateral malleoli without fixation of the posterior lip.    SURGEON: Henry Atkins M.D.     ASSISTANT: None    ANESTHESIA: General    SPECIMEN: None    ESTIMATED BLOOD LOSS: 25 mL    IMPLANTS: Ivan distal fibular locking plate, Ivan 4.0 cannulated screws x 2      INDICATIONS: The patient is a 45 y.o. female who presented with above.  I discussed the risks and benefits of the procedure which include but are not limited to risks of infection, wound healing complication, neurovascular injury, pain, malunion, non-union, malrotation, and the medical risks of anesthesia including MI, stroke, and death.  Alternatives to surgery were also discussed, including non-operative management, which I did not recommend.  The patient was in agreement with the plan to proceed, and the informed consent was signed and documented.  I met with the patient pre-operatively and marked the operative extremity with their agreement.  We proceeded to the operating room.     DESCRIPTION OF PROCEDURE:  Patient was seen in the preoperative holding area on the day of surgery. The operative site was marked with my initials.  she w supine as taken to the operating room and placed supine on the operative table.  Anesthesia was induced.  The operative extremity was prepped and draped in the normal sterile fashion.  Operative pause was conducted and the correct patient, site, side, procedure, and surgeon's initials on extremity were identified.  We began on the lateral side of the distal fibula.  Standard distal fibular incision was performed.  Fracture site was identified cleaned of hematoma and reduced using a pointed reduction clamp.  Once restored anatomic alignment a 2.7 mm lag screw was placed  from posterior to anterior distal to proximal.  This complex compress the fracture site appropriately.  We then placed our plate in position.  Drilled and placed several nonlocking screws above below the fracture compressing the plate to the bone.  We then drilled and placed multiple unicortical locking screws distally and completed our fixation with our nonlocking screws in the shaft.  We then placed our attention on the medial side.  X-rays showed minimally displaced medial malleolus fracture so we elected to put proceed with percutaneous fixation.  Guidewires were placed percutaneously in retrograde fashion though checked on AP and lateral views.  Once were satisfied we then drilled and placed 4.0 mm partially-threaded cannulated screws applying some compression across the fracture.  Wires were removed and final images were obtained including external rotation stress view showed stable ankle with anatomic alignment of the ankle mortise.  Appropriate hardware in good position.  The wounds were irrigated sterile saline and closed in layered fashion after vancomycin tobramycin powder placed in the wounds.  Sterile dressings were applied she was placed in well-padded cam boot.  She was woken taken to PACU stable condition.    POSTOPERATIVE PLAN: Nonweightbearing right lower extremity, weightbearing as tolerated left lower extremity in a cam boot weight bearing.  Mobilize with physical and occupational therapies.  DVT prophylaxis with SCDs and Lovenox until mobilizing independently and then can be switched to aspirin for 4 weeks.  The patient will follow up in clinic in 2 weeks to check wounds and remove sutures/staples.      ____________________________________   Henry Atkins M.D.   DD: 7/23/2025  11:28 AM

## 2025-07-23 NOTE — THERAPY
Occupational Therapy Contact Note    Patient Name: Cinthia Albarado  Age:  45 y.o., Sex:  female  Medical Record #: [id number]  Today's Date: 7/23/2025    OT consult rec'd. Pending R ankle ORIF; scheduled for today. Will hold OT eval and re-attempt as appropriate/able post op.

## 2025-07-23 NOTE — CARE PLAN
The patient is Stable - Low risk of patient condition declining or worsening    Shift Goals  Clinical Goals: Pain control, Sx, Mobility, CMS Checks, Rest  Patient Goals: POC Updates  Family Goals: POC Updates    Progress made toward(s) clinical / shift goals:          Problem: Knowledge Deficit - Standard  Goal: Patient and family/care givers will demonstrate understanding of plan of care, disease process/condition, diagnostic tests and medications  Outcome: Progressing     Problem: Skin Integrity  Goal: Skin integrity is maintained or improved  Outcome: Progressing     Problem: Pain - Standard  Goal: Alleviation of pain or a reduction in pain to the patient’s comfort goal  Outcome: Progressing       Patient is not progressing towards the following goals:

## 2025-07-23 NOTE — CARE PLAN
Problem: Knowledge Deficit - Standard  Goal: Patient and family/care givers will demonstrate understanding of plan of care, disease process/condition, diagnostic tests and medications  Outcome: Progressing  Note: Document in Patient Education    1.  Patient and family/caregiver oriented to unit, equipment, visitation policy and means for communicating concern  2.  Complete/review Learning Assessment  3.  Assess knowledge level of disease process/condition, treatment plan, diagnostic tests and medications  4.  Explain disease process/condition, treatment plan, diagnostic tests and medications    Expected end:  7/23/2025  Education provided to the pt at bedside all questions answered.      Problem: Skin Integrity  Goal: Skin integrity is maintained or improved  Note: Skin has remained intact throughout my shift.      Problem: Pain - Standard  Goal: Alleviation of pain or a reduction in pain to the patient’s comfort goal  Note: Pain has been kept at comfort level throughout my shift   The patient is Stable - Low risk of patient condition declining or worsening    Shift Goals  Clinical Goals: Pain control and rest  Patient Goals: POC updates  Family Goals: Updates

## 2025-07-23 NOTE — ANESTHESIA PROCEDURE NOTES
Peripheral Block    Date/Time: 7/23/2025 10:01 AM    Performed by: Bashir Irving M.D.  Authorized by: Bashir Irving M.D.    Patient Location:  Pre-op  Start Time:  7/23/2025 10:01 AM  End Time:  7/23/2025 10:11 AM  Reason for Block: at surgeon's request and post-op pain management ONLY    patient identified, IV checked, site marked, risks and benefits discussed, surgical consent, monitors and equipment checked, pre-op evaluation and timeout performed    Patient Position:  Supine  Prep: ChloraPrep    Monitoring:  Heart rate, continuous pulse ox and cardiac monitor  Block Region:  Lower Extremity  Lower Extremity - Block Type:  Selective FEMORAL nerve block at the Adductor Canal and SCIATIC nerve block, lateral approach    Laterality:  Right  Procedures: ultrasound guided  Image captured, interpreted and electronically stored.  Local Infiltration:  Lidocaine  Strength:  1 %  Dose:  3 ml  Block Type:  Single-shot  Needle Length:  100mm  Needle Gauge:  21 G  Needle Localization:  Ultrasound guidance  Ultrasound picture in chart  Injection Assessment:  Negative aspiration for heme, no paresthesia on injection, incremental injection, local visualized surrounding nerve on ultrasound and paresthesia-transient/resolved  Evidence of intravascular injection: No     Adductor canal block + Popliteal block for ORIF right ankle    US Guided Selective Femoral Nerve Block at Adductor Canal:   US probe placed at mid-thigh level on externally rotated leg and femur identified.  Probe directed medially until Sartorius Muscle (SM), Femoral Artery (FA) and Saphenous Nerve (SN) identified in Adductor Canal (AC).  Needle inserted anterolateral to probe in an in plane approach into a subsartorial perivascular perineural position.  After negative aspiration LA injected with ease and visualized spreading within the AC.    US Guided Sciatic Nerve Block   US probe placed several cm proximal to popliteal crease on posterior thigh and scanned caudad and  cephalad until Sciatic Nerve (SN) identified superficial/lateral to popliteal artery.  Needle inserted lateral to probe in an in plane approach under direct visualization to a perineural position.  After negative aspiration LA injected with ease and visualized surrounding the SN.      Total Local:  10 mL 0.25% bupivacaine plain for adductor canal block  30 mL 0.5% ropivacaine plain for popliteal block

## 2025-07-23 NOTE — ANESTHESIA POSTPROCEDURE EVALUATION
Patient: Cinthia Albarado    Procedure Summary       Date: 07/23/25 Room / Location: [place] [address] / SURGERY [facility]    Anesthesia Start: 1024 Anesthesia Stop: 1131    Procedure: ORIF, ANKLE (Right: Ankle) Diagnosis: (Right Ankle Fracture)    Surgeons: Henry Atkins M.D. Responsible Provider: Bashir Irving M.D.    Anesthesia Type: general, peripheral nerve block ASA Status: 2            Final Anesthesia Type: general, peripheral nerve block  Last vitals  BP   Blood Pressure: (!) 89/53    Temp   36.2 °C (97.1 °F)    Pulse   (!) 59   Resp   15    SpO2   99 %      Anesthesia Post Evaluation    Patient location during evaluation: PACU  Patient participation: complete - patient participated  Level of consciousness: awake and alert    Airway patency: patent  Anesthetic complications: no  Cardiovascular status: hemodynamically stable  Respiratory status: acceptable  Hydration status: euvolemic    PONV: none          No notable events documented.     Nurse Pain Score: 9 (NPRS)

## 2025-07-23 NOTE — OR NURSING
1130: Pt arrived from OR, handoff received from anesthesiologist and RN. Patient sleep, breathing even and unlabored. Vitals stable. Dressing to RLE, toes pink/warm. Contacts in place.     1148: Patient awake, oriented x4, moving all extremities,   tenzin to slightly;y wiggle toes.  Denies pain and nausea.     1250: Patient's  updated on status    1310: Report give to T3 RNMago . Awaiting transport. Dressing remains c/d/I. Patient denies pain.     Patient transported to T322, in stable condition. No belongings in pacu.

## 2025-07-23 NOTE — THERAPY
Physical Therapy Contact Note    Patient Name: Cinthia Albarado  Age:  45 y.o., Sex:  female  Medical Record #: [id number]  Today's Date: 7/23/2025    Pt remains pending ORIF of R ankle fx, now scheduled for today. Will hold PT eval and f/u post-op as able/appropriate.

## 2025-07-23 NOTE — PROGRESS NOTES
Assumed care of pt 07/22/2025  PM assessment complete  Education provided to the pt at bedside all questions answered.   Bed is locked and in the lowest position call light is within reach. Hourly rounding in place

## 2025-07-24 ASSESSMENT — COGNITIVE AND FUNCTIONAL STATUS - GENERAL
DRESSING REGULAR UPPER BODY CLOTHING: A LITTLE
CLIMB 3 TO 5 STEPS WITH RAILING: TOTAL
DAILY ACTIVITIY SCORE: 16
SUGGESTED CMS G CODE MODIFIER DAILY ACTIVITY: CK
TURNING FROM BACK TO SIDE WHILE IN FLAT BAD: A LITTLE
TOILETING: A LOT
MOVING FROM LYING ON BACK TO SITTING ON SIDE OF FLAT BED: A LITTLE
MOVING TO AND FROM BED TO CHAIR: A LOT
WALKING IN HOSPITAL ROOM: TOTAL
DRESSING REGULAR LOWER BODY CLOTHING: A LOT
MOBILITY SCORE: 12
STANDING UP FROM CHAIR USING ARMS: A LOT
HELP NEEDED FOR BATHING: A LOT
PERSONAL GROOMING: A LITTLE
SUGGESTED CMS G CODE MODIFIER MOBILITY: CL

## 2025-07-24 ASSESSMENT — GAIT ASSESSMENTS: GAIT LEVEL OF ASSIST: UNABLE TO PARTICIPATE

## 2025-07-24 ASSESSMENT — PAIN DESCRIPTION - PAIN TYPE
TYPE: ACUTE PAIN
TYPE: ACUTE PAIN;SURGICAL PAIN
TYPE: ACUTE PAIN
TYPE: ACUTE PAIN;SURGICAL PAIN
TYPE: ACUTE PAIN

## 2025-07-24 ASSESSMENT — ACTIVITIES OF DAILY LIVING (ADL): TOILETING: INDEPENDENT

## 2025-07-24 NOTE — PROGRESS NOTES
Virtual Nurse rounding complete. Language Line utilized for Sinhala interpretation, ID# [id number]    Round Needs: No needs at this time.

## 2025-07-24 NOTE — PROGRESS NOTES
Hospital Medicine Daily Progress Note    Date of Service  7/24/2025    Chief Complaint  R ankle fracture    Hospital Course  Cinthia Albarado is a 45 y.o. female without much past medical history, admitted 7/20/2025 with mechanical ground-level fall with subsequent bilateral lower extremity pain.  On evaluation, she was found to have trimalleolar right ankle fracture along with left anterior superior navicular fracture.  Bilateral knee x-rays did not show any acute abnormalities.  Otherwise labs were unremarkable.  Orthopedics was consulted recommended surgical repair.  Patient was placed on analgesics. Vitamin D level low at 23 for which she was started on vitamin D supplementation.    Interval Problem Update  7/24/2025 - I reviewed the patient's chart.  Patient underwent ORIF of the right trimalleolar after fracture with fixation of the medial and lateral malleoli without fixation of the posterior lip yesterday 7/23/2025.  Otherwise, there were no significant overnight events. Remains hemodynamically stable and afebrile. Stable on RA.  Orthopedics recommends nonweightbearing on the right lower extremity, weightbearing as tolerated on the left lower extremity in a cam boot, along with continued pharmacologic DVT prophylaxis.  WBC 12,700.  Hemoglobin is stable at 12.7.  Electrolytes and renal function are normal.    > I have personally seen and examined the patient today.  She has pain on the right ankle, which is reasonably controlled.  No other complaints such as nausea or vomiting.  No GI complaints.    I personally reviewed all lab results mentioned above. Prior medical records from this institution and outside facilities were independently reviewed as noted. I also personally reviewed all ER physician and consultant recommendations and plans as documented above. History was independently obtained by myself. I have discussed this patient's plan of care and discharge plan at IDT rounds today with  Case Management, Nursing, Nursing leadership, and other members of the IDT team.    Consultants/Specialty  orthopedics    Code Status  Full Code    Disposition  The patient is not medically cleared for discharge to home or a post-acute facility.      Discharge plan TBD.  PT/OT evaluation.    I have placed the appropriate orders for post-discharge needs.    Review of Systems  ROS     Pertinent positives/negatives as mentioned above.     A complete review of systems was personally done by me. All other systems were negative.       Physical Exam  Temp:  [35.9 °C (96.6 °F)-36.6 °C (97.9 °F)] 36.1 °C (97 °F)  Pulse:  [58-99] 75  Resp:  [12-19] 15  BP: ()/(53-72) 112/67  SpO2:  [94 %-100 %] 96 %    Physical Exam  Constitutional:       Appearance: Normal appearance.   HENT:      Head: Normocephalic and atraumatic.      Right Ear: External ear normal.      Left Ear: External ear normal.      Mouth/Throat:      Mouth: Mucous membranes are moist.      Pharynx: Oropharynx is clear.   Eyes:      Pupils: Pupils are equal, round, and reactive to light.   Cardiovascular:      Rate and Rhythm: Normal rate and regular rhythm.   Pulmonary:      Effort: Pulmonary effort is normal.      Breath sounds: Normal breath sounds.   Abdominal:      Palpations: Abdomen is soft.   Musculoskeletal:      Comments: Left ankle boot in place  Right ankle surgical site intact, dressing CDI   Skin:     General: Skin is warm and dry.      Capillary Refill: Capillary refill takes less than 2 seconds.   Neurological:      General: No focal deficit present.      Mental Status: She is alert and oriented to person, place, and time.   Psychiatric:         Mood and Affect: Mood normal.         Behavior: Behavior normal.         Thought Content: Thought content normal.             Fluids    Intake/Output Summary (Last 24 hours) at 7/24/2025 1016  Last data filed at 7/24/2025 0900  Gross per 24 hour   Intake 740 ml   Output 1000 ml   Net -260 ml         Laboratory  Recent Labs     07/22/25  0908 07/23/25  0053 07/24/25  0604   WBC 9.0 8.1 12.7*   RBC 4.27 4.17* 4.25   HEMOGLOBIN 12.9 12.6 12.7   HEMATOCRIT 39.2 38.2 38.4   MCV 91.8 91.6 90.4   MCH 30.2 30.2 29.9   MCHC 32.9 33.0 33.1   RDW 46.4 46.1 43.9   PLATELETCT 228 226 231   MPV 9.5 9.6 9.2     Recent Labs     07/22/25  0908 07/23/25  0053 07/24/25  0604   SODIUM 138 136 139   POTASSIUM 4.9 5.0 4.7   CHLORIDE 106 102 106   CO2 24 24 22   GLUCOSE 102* 121* 145*   BUN 9 11 9   CREATININE 0.64 0.69 0.62   CALCIUM 9.2 9.6 9.0                     Imaging      Assessment/Plan  * Closed trimalleolar fracture of right ankle, initial encounter- (present on admission)  Assessment & Plan  - Due to mechanical ground-level fall.  Imaging showed trimalleolar right ankle fracture along with left anterior superior navicular fracture.  - now s/p ORIF of the right trimalleolar after fracture with fixation of the medial and lateral malleoli without fixation of the posterior lip on 7/23/2025  - Continue vitamin D replacement for vitamin D insufficiency.  - continue pain control with Tylenol, oral oxycodone and IV Dilaudid. Monitor for narcotic side effects particularly respiratory depression, AMS, and constipation.    -Monitor for ABLA postoperatively.  Check hemoglobin daily.  - Continue pharmacologic DVT prophylaxis while in the hospital.  Continue Lovenox SQ. Patient had low-risk below the knee procedure, with no prior history of VTE. Pt is Nonweightbearing on the affected extremity. Will need on-going DVT prophylaxis on discharge, can switch to ASA 81mg BID x 4 weeks on discharge.  -Pending PT/OT evaluation.    Closed nondisplaced fracture of navicular bone of left foot- (present on admission)  Assessment & Plan  - Management as above.    Vitamin D insufficiency- (present on admission)  Assessment & Plan  - Continue cholecalciferol 5000 units daily.    Normal anion gap metabolic acidosis- (present on  admission)  Assessment & Plan  - Resolved.  -Continue to monitor.  BMP in the morning.    Fall- (present on admission)  Assessment & Plan  - Pending PT/OT evaluation.         VTE prophylaxis: Lovenox SQ    My total time spent caring for the patient on the day of the encounter was 50 minutes. This does not include time spent on separately billable procedures/tests.

## 2025-07-24 NOTE — PROGRESS NOTES
4 Eyes Skin Assessment Completed by JANAE Mercedes and MARIE Nguyen    Skin assessment is primarily focused on high risk bony prominences. Pay special attention to skin beneath and around medical devices, high risk bony prominences, skin to skin areas and areas where the patient lacks sensation to feel pain and areas where the patient previously had breakdown.     Head (Occipital):  WDL   Ears (Under Medical Devices): WDL   Nose (Under Medical Devices): WDL   Mouth:  WDL   Neck: WDL   Breast/Chest:  WDL   Shoulder Blades:  WDL   Spine:   WDL   (R) Arm/Elbow/Hand: WDL   (L) Arm/Elbow/Hand: WDL   Abdomen: WDL   Pannus/Groin:  WDL   Sacrum/Coccyx:   WDL   (R) Ischial Tuberosity (Sit Bones):  WDL   (L) Ischial Tuberosity (Sit Bones):  WDL   (R) Leg:  Incision Ace wrap dressing CDI - sanginious dressing    (L) Leg:  WDL   (R) Heel:  XIANG DIP   (R) Foot/Toe: XIANG DIP   (L) Heel: WDL   (L) Foot/Toe:  WDL gen swelling to LLE        DEVICES IN USE:   Respiratory Devices:  NA, patient on room air  Feeding Devices:  N/A   Lines & BP Monitoring Devices:  Peripheral IV    Orthopedic Devices:  Ace wrap CAM boot in place to LLE   Miscellaneous Devices:      PROTOCOL INTERVENTIONS:   Standard/Trauma Bed:  Already in place  Glide Sheet:  Already in place    WOUND PHOTOS:   N/A no wounds identified    WOUND CONSULT:   N/A, no advanced wound care needs identified

## 2025-07-24 NOTE — CARE PLAN
The patient is Stable - Low risk of patient condition declining or worsening    Shift Goals  Clinical Goals: Pain control, rest, safety  Patient Goals: rest, updates  Family Goals: POC updates    Progress made toward(s) clinical / shift goals:  Yes    Problem: Knowledge Deficit - Standard  Goal: Patient and family/care givers will demonstrate understanding of plan of care, disease process/condition, diagnostic tests and medications  Outcome: Progressing     Problem: Skin Integrity  Goal: Skin integrity is maintained or improved  Outcome: Progressing     Problem: Pain - Standard  Goal: Alleviation of pain or a reduction in pain to the patient’s comfort goal  Outcome: Progressing       Patient is not progressing towards the following goals:

## 2025-07-24 NOTE — DISCHARGE PLANNING
Case Management Discharge Planning    Admission Date: 7/20/2025  GMLOS: 2.7  ALOS: 3    6-Clicks ADL Score: 16  6-Clicks Mobility Score: 12  PT and/or OT Eval ordered: Yes  Post-acute Referrals Ordered: Yes  Post-acute Choice Obtained: Yes  Has referral(s) been sent to post-acute provider:  Yes      Anticipated Discharge Dispo: Discharge Disposition: Discharged to home/self care (01)    DME Needed: No    Action(s) Taken: Updated Provider/Nurse on Discharge Plan and Referral(s) sent- Patient discussed during IDT rounds with medical team. SNF referrals sent to [facility].    Escalations Completed: Pending Discharge Destination    Medically Clear: No    Next Steps: Case Management will continue to follow for discharge planning needs.     Barriers to Discharge: Medical clearance and Pending Placement    Is the patient up for discharge tomorrow: No

## 2025-07-24 NOTE — THERAPY
"Physical Therapy   Initial Evaluation     Patient Name:  Cinthia Albarado  Age:  45 y.o., Sex:  female  Medical Record #:  [id number]  Today's Date: 7/24/2025     Precautions  Medical: (P) Fall Risk  Orthopedic: (P) CAM Boot (LLE)  Weight Bearing: (P) Non Weight Bearing Right Lower Extremity, Weight Bearing as Tolerated Left Lower Extremity (WBAT LLE in CAM boot)    Assessment  Patient is 45 y.o. female admitted after falling down the stairs in front of her home resulting in a right trimalleolar fracture and a left anterior superior navicular fracture. Pt seen s/p ORIF R ankle.     Patient received EOB with OT and agreeable to PT session. Pt required modA x2 person assist for sit pivot transfer to [facility], required maxA for STS with FWW from [facility] for karol-care, and modA x2 person assist for sit pivot transfer from [facility] to recliner chair. Pt with poor adherence to NWB RLE and attempting to perform TTWB; required assistance to maintain NWB RLE. Pt is primarily limited by pain, weakness, impaired balance, and limited activity tolerance. Recommend post acute placement. Will follow for acute care PT needs.     Plan    Physical Therapy Initial Treatment Plan   Treatment Plan : (P) Bed Mobility, Gait Training, Neuro Re-Education / Balance, Self Care / Home Evaluation, Stair Training, Therapeutic Activities, Therapeutic Exercise  Treatment Frequency: (P) 4 Times per Week  Duration: (P) Until Therapy Goals Met    DC Equipment Recommendations: (P) Unable to determine at this time (if pt were to return home would most likely need WC)  Discharge Recommendations: (P) Recommend post-acute placement for additional physical therapy services prior to discharge home       Subjective    \"I fell because my left foot was hurting and I have had problems with it for a while, and now it is my only good foot\".      Objective       07/24/25 1021    Services   Is patient using  services for this encounter? Yes "   Language Interpreted Swedish    Name Hanna #[id number]    Mode iPad   Refusal signed by patient? No   Content of Interpretation (select all)   (PT eval)   Initial Contact Note    Initial Contact Note Order Received and Verified, Physical Therapy Evaluation in Progress with Full Report to Follow.   Precautions   Medical Fall Risk   Orthopedic CAM Boot  (LLE)   Weight Bearing Non Weight Bearing Right Lower Extremity;Weight Bearing as Tolerated Left Lower Extremity  (WBAT LLE in CAM boot)   Vitals   O2 (LPM) 0   O2 Delivery Device None - Room Air   Vitals Comments VSS, declines dizziness   Pain 0 - 10 Group   Location Foot   Location Orientation Right;Left   Therapist Pain Assessment Post Activity Pain Same as Prior to Activity;Nurse Notified  (pain not rated, increased pain with mobility)   Prior Living Situation   Prior Services Home-Independent   Housing / Facility Mobile Home   Steps Into Home 6   Steps In Home 0   Equipment Owned None   Lives with - Patient's Self Care Capacity Spouse   Comments Reports spouse works during the day. Pt has a 25 year old son that could provide check in assist but reports he would not physically be able to help her   Prior Level of Functional Mobility   Bed Mobility Independent   Transfer Status Independent   Ambulation Independent   Ambulation Distance community   Assistive Devices Used None   Stairs Independent   History of Falls   History of Falls Yes   Date of Last Fall   (reason for admit)   Cognition    Cognition / Consciousness WDL   Level of Consciousness Alert   Comments pleasant and participatory, receptive to education   Active ROM Lower Body    Active ROM Lower Body  X   Comments B ankles limited 2/2 pain and edema   Strength Lower Body   Lower Body Strength  X   Comments RLE limited 2/2 NWB, LLE limited 2/2 pain; grossly 4/5   Other Treatments   Other Treatments Provided Extra time taken to discuss dc planning as well as DME if pt were to return  home   Balance Assessment   Sitting Balance (Static) Fair   Sitting Balance (Dynamic) Fair   Standing Balance (Static) Poor   Standing Balance (Dynamic) Poor -   Weight Shift Sitting Fair   Weight Shift Standing Poor   Comments w/FWW   Bed Mobility    Comments EOB pre, in chair post   Gait Analysis   Gait Level Of Assist Unable to Participate   Functional Mobility   Sit to Stand Maximal Assist   Bed, Chair, Wheelchair Transfer Moderate Assist   Toilet Transfers Moderate Assist  (BSC)   Transfer Method Sit Pivot   Mobility EOB > BSC > chair   6 Clicks Assessment - How much HELP from from another person do you currently need... (If the patient hasn't done an activity recently, how much help from another person do you think he/she would need if he/she tried?)   Turning from your back to your side while in a flat bed without using bedrails? 3   Moving from lying on your back to sitting on the side of a flat bed without using bedrails? 3   Moving to and from a bed to a chair (including a wheelchair)? 2   Standing up from a chair using your arms (e.g., wheelchair, or bedside chair)? 2   Walking in hospital room? 1   Climbing 3-5 steps with a railing? 1   6 clicks Mobility Score 12   Short Term Goals    Short Term Goal # 1 Pt will be able to perform supine <> sit with SPV in 6 visits to progress bed mobility   Short Term Goal # 2 Pt will be able to perform sit pivot transfer with SPV in 6 visits to progress functional transfers   Short Term Goal # 3 Pt will be able to ambulate 25ft with FWW and SPV in 6 visits in order to navigate her bathroom at home   Short Term Goal # 4 Pt will be able to self propel 150ft in WC in 6 visits to progress functional mobility   Short Term Goal # 5 Pt will be able to ascend/descend 6 stairs with CGA in 6 visits in order to safely navigate her home  (reports her spouse and son could probably push her up in a WC if needed)   Education Group   Education Provided Role of Physical Therapist;Use  of Assistive Device;Brace Wear and Care;Weight Bearing Precautions   Role of Physical Therapist Patient Response Patient;Acceptance;Explanation;Verbal Demonstration   Use of Assistive Device Patient Response Patient;Acceptance;Explanation;Action Demonstration   Weight Bearing Precautions Patient Response Patient;Acceptance;Explanation;Verbal Demonstration;Reinforcement Needed   Brace Wear & Care Patient Response Patient;Acceptance;Explanation;Verbal Demonstration   Additional Comments frequent cues for NWB RLE   Physical Therapy Initial Treatment Plan    Treatment Plan  Bed Mobility;Gait Training;Neuro Re-Education / Balance;Self Care / Home Evaluation;Stair Training;Therapeutic Activities;Therapeutic Exercise   Treatment Frequency 4 Times per Week   Duration Until Therapy Goals Met   Problem List    Problems Pain;Impaired Bed Mobility;Impaired Transfers;Impaired Ambulation;Functional ROM Deficit;Functional Strength Deficit;Impaired Balance;Decreased Activity Tolerance   Anticipated Discharge Equipment and Recommendations   DC Equipment Recommendations Unable to determine at this time  (if pt were to return home would most likely need WC)   Discharge Recommendations Recommend post-acute placement for additional physical therapy services prior to discharge home   Interdisciplinary Plan of Care Collaboration   IDT Collaboration with  Nursing;Occupational Therapist   Patient Position at End of Therapy Seated;Chair Alarm On;Call Light within Reach;Tray Table within Reach;Phone within Reach   Collaboration Comments RN updated   Session Information   Date / Session Number  7/24 - 1 (1/4, 7/30)     Patient seen for team evaluation with Occupational Therapist for the following reason(s):  Patient required 2 person assistance for safety and to provide effective interventions. Each discipline assisted patient with appropriate and separate goals. Due to the medical complexity, the skill of both practitioners is needed to  monitor vitals, patient status, and adjust the intervention to fit the patient's needs and goals. Therapy sessions needed to be done by a certain time period for both disciplines, and did not impede patient's progress. Physical Therapist facilitated seated balance, sequencing for transfers, STS, weight bearing education, discussed DME and dc planning while Occupational Therapist simultaneously treated pt according to POC.

## 2025-07-24 NOTE — CARE PLAN
Problem: Knowledge Deficit - Standard  Goal: Patient and family/care givers will demonstrate understanding of plan of care, disease process/condition, diagnostic tests and medications  Outcome: Progressing     Problem: Skin Integrity  Goal: Skin integrity is maintained or improved  Outcome: Progressing     Problem: Pain - Standard  Goal: Alleviation of pain or a reduction in pain to the patient’s comfort goal  Outcome: Progressing   The patient is Stable - Low risk of patient condition declining or worsening    Shift Goals  Clinical Goals: patient safety, administer pain medication PRN  Patient Goals: rest  Family Goals: POC updates    Progress made toward(s) clinical / shift goals:  patient remained safe from injury. Pain medication administered PRN    Patient is not progressing towards the following goals:

## 2025-07-24 NOTE — PROGRESS NOTES
"    Orthopedic PA Progress Note    Interval changes:  Patient doing well postop  R ankle dressings with moderate serosanguineous drainage- changed today   - boot at bedside to be worn during all mobilization  NWB RLE, WBAT LLE in boot  No pending ortho procedures  Cleared for DC from orthopedic standpoint pending therapy recs and medical optimization    ROS - Patient denies any new issues.  Denies any numbness or tingling. Pain controlled.    /67   Pulse 75   Temp 36.1 °C (97 °F) (Temporal)   Resp 15   Ht 1.676 m (5' 6\")   Wt 77.1 kg (170 lb)   SpO2 96%     Patient seen and examined  No acute distress  Breathing non labored  RRR  RLE: Surgical dressing with moderate serosanguineous drainage. Incision without purulence or erythema. Patient clearly fires tibialis anterior, EHL, and gastrocnemius/soleus. Sensation is intact to light touch throughout superficial peroneal, deep peroneal, tibial, saphenous, and sural nerve distributions. Strong and palpable 2+ dorsalis pedis and posterior tibial pulses with capillary refill less than 2 seconds.     Recent Labs     07/22/25  0908 07/23/25  0053 07/24/25  0604   WBC 9.0 8.1 12.7*   RBC 4.27 4.17* 4.25   HEMOGLOBIN 12.9 12.6 12.7   HEMATOCRIT 39.2 38.2 38.4   MCV 91.8 91.6 90.4   MCH 30.2 30.2 29.9   MCHC 32.9 33.0 33.1   RDW 46.4 46.1 43.9   PLATELETCT 228 226 231   MPV 9.5 9.6 9.2       Active Hospital Problems    Diagnosis     Vitamin D insufficiency [E55.9]      Priority: Medium    Closed trimalleolar fracture of right ankle, initial encounter [S82.851A]     Closed nondisplaced fracture of navicular bone of left foot [S92.255A]     Fall [W19.XXXA]     Normal anion gap metabolic acidosis [E87.20]        DX-PORTABLE FLUORO > 1 HOUR   Final Result      Portable fluoroscopy utilized for 29.1 seconds.         INTERPRETING LOCATION: [address] [place] [address], [facility]      DX-ANKLE 2- VIEWS RIGHT   Final Result      Digitized intraoperative radiograph is submitted for " review. This examination is not for diagnostic purpose but for guidance during a surgical procedure. Please see the patient's chart for full procedural details.         INTERPRETING LOCATION: [address], [place], [address]      CT-FOOT W/O PLUS RECONS LEFT   Final Result         1.  Trimalleolar fracture.   2.  Small bony fragment at the anterior superior aspect of the navicular bone, could represent small avulsion fracture fragment.   3.  Mildly comminuted fracture of the medial aspect of the navicular bone.      CT-ANKLE W/O PLUS RECONS RIGHT   Final Result         1.  Trimalleolar fracture.         DX-ANKLE 3+ VIEWS LEFT   Final Result         1.  Anterior superior navicular fracture         DX-ANKLE 3+ VIEWS RIGHT   Final Result         1.  Trimalleolar fracture         DX-KNEE 3 VIEWS RIGHT   Final Result         1.  No acute traumatic bony injury.      DX-TIBIA AND FIBULA LEFT   Final Result         1.  No acute traumatic bony injury.      DX-TIBIA AND FIBULA RIGHT   Final Result         1.  Trimalleolar fracture      DX-KNEE 3 VIEWS LEFT   Final Result         1.  No acute traumatic bony injury.          Assessment/Plan:  Patient doing well postop  R ankle dressings with moderate serosanguineous drainage- changed today   - boot at bedside to be worn during all mobilization  NWB RLE, WBAT LLE in boot  No pending ortho procedures  Cleared for DC from orthopedic standpoint pending therapy recs and medical optimization    POD#1 S/p  Open reduction internal fixation right trimalleolar ankle fracture with fixation of the medial and lateral malleoli without fixation of the posterior lip.  Wt bearing status - NWB RLE, WBAT LLE in boot  Future Procedures - None planned   Wound care/Drains - Dressings to be changed every other day by nursing. Or PRN for saturation starting POD#2  Sutures/Staples out- 14-21 days post operatively. Removal will completed by ortho SUJATHA's unless transferred.  Inpatient DVT prophylaxis: Lovenox  initiated 7/24  DVT Prophylaxis outpatient: ASA 81 mg PO BID x4 weeks  PT/OT-initiated  Antibiotics:  Perioperative completed  DVT Prophylaxis- TEDS/SCDs/Foot pumps  Case Coordination for Discharge Planning - Disposition per therapy recs.   Follow up with Dr. Atkins 2 weeks following final surgery for repeat imaging and wound check. (Est follow up date 8/6)

## 2025-07-24 NOTE — THERAPY
"Occupational Therapy   Initial Evaluation     Patient Name:  Cinthia Albarado  Age:  45 y.o., Sex:  female  Medical Record #:  [id number]  Today's Date:  7/24/2025     Precautions  Medical: Fall Risk  Orthopedic: CAM Boot  Weight Bearing: Non Weight Bearing Right Lower Extremity, Weight Bearing as Tolerated Left Lower Extremity (WBAT LLE in CAM boot)    Assessment  Patient is a 45 y.o. female with a diagnosis of R triameollar ankle fracture and L navicular fx after a MGLF and is POD #1 ORIF R ankle. L foot fx managed non operatively in a short CAM boot. Additional factors influencing patient status / progress: No PMHx on file; additionally was found to have low vitamin D and normal anion gap metabolic acidosis.      During OT eval, pt presented with impaired strength, activity tolerance, balance, mobility and pain. Pt was receptive to education provided on CAM boot mgmt and DME recommendations. Pt needed maxA x2 with FWW for STS x2. Pt was unable to tolerate standing on L foot or effectively use BUEs to weight shift with FWW for RLE NWB. Pt was able to perform lateral scoot transfers with modA to the drop arm BSC and then recliner. Pt needed maxA for toileting. Recommend post-acute placement.     Plan    Occupational Therapy Initial Treatment Plan   Treatment Interventions: Self Care / Activities of Daily Living, Adaptive Equipment, Neuro Re-Education / Balance, Therapeutic Exercises, Therapeutic Activity, Family / Caregiver Training  Treatment Frequency: 4 Times per Week  Duration: Until Therapy Goals Met    DC Equipment Recommendations: Tub Transfer Bench, Tub / Shower Seat, Bed Side Commode (TTB vs shower chair depending on pt's preference for which she shower she uses)  Discharge Recommendations: Recommend post-acute placement for additional occupational therapy services prior to discharge home     Subjective    \"My foot has been hurting for a while and caused me to fall.\"      Objective     " 07/24/25 1016    Services   Is patient using  services for this encounter? Yes   Language Interpreted Arabic    Name Hanna #[id number]    Mode iPad   Refusal signed by patient? No   Content of Interpretation (select all) History/Visit information;Patient Education  (OT eval)   Initial Contact Note    Initial Contact Note Order Received and Verified, Occupational Therapy Evaluation in Progress with Full Report to Follow.   Prior Living Situation   Prior Services Home-Independent   Housing / Facility Mobile Home   Steps Into Home 6   Steps In Home 0   Bathroom Set up Walk In Shower;Bathtub / Shower Combination   Equipment Owned None   Lives with - Patient's Self Care Capacity Spouse   Comments Pt's spouse works during the day. Reports her 26 y/o son could stay with her for a week but he can't provide much physical assistance   Prior Level of ADL Function   Self Feeding Independent   Grooming / Hygiene Independent   Bathing Independent   Dressing Independent   Toileting Independent   Prior Level of IADL Function   Medication Management Independent   Laundry Independent   Kitchen Mobility Independent   Finances Independent   Home Management Independent   Shopping Independent   Prior Level Of Mobility Independent Without Device in Community   Driving / Transportation Unable To Determine At This Time   Occupation (Pre-Hospital Vocational) Not Employed   History of Falls   History of Falls Yes   Date of Last Fall   (reason for admit)   Precautions   Medical Fall Risk   Orthopedic CAM Boot   Weight Bearing Non Weight Bearing Right Lower Extremity;Weight Bearing as Tolerated Left Lower Extremity  (WBAT LLE in CAM boot)   Vitals   O2 Delivery Device None - Room Air   Pain   Intervention Repositioned;Rest;Elevation;Nurse Notified   Pain 0 - 10 Group   Location Foot   Location Orientation Left;Right   Pain Rating Scale (NPRS)   (did not quantify)   Description Aching   Non Verbal  Descriptors   Non Verbal Scale  Calm   Cognition    Cognition / Consciousness WDL   Level of Consciousness Alert   Comments Pleasant and cooperative   Active ROM Upper Body   Active ROM Upper Body  WDL   Strength Upper Body   Upper Body Strength  X   Gross Strength Generalized Weakness, Equal Bilaterally.    Comments Functional but difficulty using her BUEs on the FWW to effectively weight shift with FWW for transfers/standing   Sensation Upper Body   Upper Extremity Sensation  Not Tested   Upper Body Muscle Tone   Upper Body Muscle Tone  WDL   Neurological Concerns   Neurological Concerns No   Coordination Upper Body   Coordination WDL   Balance Assessment   Sitting Balance (Static) Fair   Sitting Balance (Dynamic) Fair   Standing Balance (Static) Poor   Standing Balance (Dynamic) Poor -   Weight Shift Sitting Fair   Weight Shift Standing Poor   Comments FWW, difficulty standing and unable to weight shift. Has fair seated weight shifts to scoot   Bed Mobility    Supine to Sit Standby Assist   Scooting Contact Guard Assist   Comments HOBE   ADL Assessment   Lower Body Dressing Moderate Assist  (education/training on CAM boot mgmt; pt would likely need assist to manage clothing over hips due to poor standing balance)   Toileting Maximal Assist  (urine incontinence/purewick malfunction. Required on person assist to stand with FWW and second person to assist pt with hygiene)   How much help from another person does the patient currently need...   Putting on and taking off regular lower body clothing? 2   Bathing (including washing, rinsing, and drying)? 2   Toileting, which includes using a toilet, bedpan, or urinal? 2   Putting on and taking off regular upper body clothing? 3   Taking care of personal grooming such as brushing teeth? 3   Eating meals? 4   6 Clicks Daily Activity Score 16   Functional Mobility   Sit to Stand Maximal Assist   Bed, Chair, Wheelchair Transfer Moderate Assist   Toilet Transfers Moderate  Assist  (BSC)   Transfer Method Sit Pivot   Mobility STS x2 with maxA and FWW, lateral seated scoot EOB>drop arm BSC>drop arm recliner   Activity Tolerance   Sitting in Chair post   Sitting Edge of Bed 10+ min   Standing ~1 min   Patient / Family Goals   Patient / Family Goal #1 To get better and go home   Short Term Goals   Short Term Goal # 1 Pt will perform LBD with Ron   Short Term Goal # 2 Pt will perform ADL txfer with SBA   Short Term Goal # 3 Pt will perform toileting with SBA   Education Group   Education Provided Activities of Daily Living;Transfers;Adaptive Equipment   Role of Occupational Therapist Patient Response Patient;Acceptance;Explanation;Verbal Demonstration   Transfers Patient Response Patient;Acceptance;Explanation;Demonstration;Verbal Demonstration;Action Demonstration;Reinforcement Needed   ADL Patient Response Patient;Acceptance;Explanation;Demonstration;Verbal Demonstration;Action Demonstration;Reinforcement Needed  (CAM boot mgmt)   Adaptive Equipment Patient Response Patient;Acceptance;Explanation;Verbal Demonstration;Reinforcement Needed  (recommendation for a tub transfer bench vs shower chair depending on if she prefers to use her WIS or tub shower at home)   Occupational Therapy Initial Treatment Plan    Treatment Interventions Self Care / Activities of Daily Living;Adaptive Equipment;Neuro Re-Education / Balance;Therapeutic Exercises;Therapeutic Activity;Family / Caregiver Training   Treatment Frequency 4 Times per Week   Duration Until Therapy Goals Met   Problem List   Problem List Decreased Active Daily Living Skills;Decreased Homemaking Skills;Decreased Upper Extremity Strength Right;Decreased Upper Extremity Strength Left;Decreased Functional Mobility;Decreased Activity Tolerance;Impaired Postural Control / Balance;Limited Knowledge of Post Op Precautions   Anticipated Discharge Equipment and Recommendations   DC Equipment Recommendations Tub Transfer Bench;Tub / Shower  Seat;Bed Side Commode  (TTB vs shower chair depending on pt's preference for which she shower she uses)   Discharge Recommendations Recommend post-acute placement for additional occupational therapy services prior to discharge home   Interdisciplinary Plan of Care Collaboration   IDT Collaboration with  Nursing;Physical Therapist   Patient Position at End of Therapy Seated;Chair Alarm On;Call Light within Reach;Tray Table within Reach;Phone within Reach;Family / Friend in Room   Collaboration Comments RN updated   Session Information   Date / Session Number  7/24 #1 (1/4, 7/30)     Physical Therapy session overlapped with Occupational Therapy to help facilitate functional transfers to the C and recliner. Each discipline assisted patient with appropriate and separate goals. OT education/training for CAM boot mgmt and DME recommendations were provided prior to PT arrival.

## 2025-07-24 NOTE — PROGRESS NOTES
"Bedside report received.  Assessment complete.  A&O x 4. Patient calls appropriately.  Patient ambulates with front wheel walker and one assist. Bed alarm on.   Patient has 5/10 pain. Patient medicated per MAR.  Denies N&V. Tolerating regular diet.  Surgical dressings CDI.  + void, + flatus, - BM.  Patient denies SOB.  SCD's off.  Patient is pleasant and cooperative with the care plan.  Review plan with of care with patient. Call light and personal belongings within reach. Hourly rounding in place. All needs met at this time.  /67   Pulse 75   Temp 36.1 °C (97 °F) (Temporal)   Resp 15   Ht 1.676 m (5' 6\")   Wt 77.1 kg (170 lb)   LMP 07/01/2025 (Exact Date)   SpO2 96%   Breastfeeding No   BMI 27.44 kg/m²     "

## 2025-07-25 ASSESSMENT — PAIN DESCRIPTION - PAIN TYPE
TYPE: ACUTE PAIN;SURGICAL PAIN
TYPE: ACUTE PAIN
TYPE: ACUTE PAIN;SURGICAL PAIN
TYPE: ACUTE PAIN;SURGICAL PAIN
TYPE: ACUTE PAIN

## 2025-07-25 NOTE — PROGRESS NOTES
Assumed care of patient, bedside shift report from Night RN complete. Patient A&Ox4, no complaints of pain at this time, call light and belongings within reach.

## 2025-07-25 NOTE — CARE PLAN
The patient is Stable - Low risk of patient condition declining or worsening    Shift Goals  Clinical Goals: pain control, ambulation  Patient Goals: rest, pain control  Family Goals: updates on POC    Progress made toward(s) clinical / shift goals:    Problem: Infection - Standard  Goal: Patient will remain free from infection  Outcome: Progressing     Problem: Wound/ / Incision Healing  Goal: Patient's wound/surgical incision will decrease in size and heals properly  Outcome: Progressing       Patient is not progressing towards the following goals:      Problem: Pain - Standard  Goal: Alleviation of pain or a reduction in pain to the patient’s comfort goal  Outcome: Not Progressing     Problem: Bowel Elimination  Goal: Establish and maintain regular bowel function  Outcome: Not Progressing     Problem: Mobility  Goal: Patient's capacity to carry out activities will improve  Outcome: Not Progressing     Problem: Self Care  Goal: Patient will have the ability to perform ADLs independently or with assistance (bathe, groom, dress, toilet and feed)  Outcome: Not Progressing

## 2025-07-25 NOTE — CARE PLAN
The patient is Stable - Low risk of patient condition declining or worsening    Shift Goals  Clinical Goals: Pain control, safety  Patient Goals: rest, pain control  Family Goals: POC updates    Progress made toward(s) clinical / shift goals:  Yes    Problem: Knowledge Deficit - Standard  Goal: Patient and family/care givers will demonstrate understanding of plan of care, disease process/condition, diagnostic tests and medications  Outcome: Progressing     Problem: Skin Integrity  Goal: Skin integrity is maintained or improved  Outcome: Progressing     Problem: Pain - Standard  Goal: Alleviation of pain or a reduction in pain to the patient’s comfort goal  Outcome: Progressing       Patient is not progressing towards the following goals:

## 2025-07-25 NOTE — PROGRESS NOTES
"    Orthopedic PA Progress Note    Interval changes:  Patient doing well overall  R ankle dressings CDI  NWB RLE, WBAT LLE in boot  No pending ortho procedures  Cleared for DC from orthopedic standpoint     ROS - Patient denies any new issues.  Denies any numbness or tingling. Pain controlled.    BP 96/62   Pulse 68   Temp 36.5 °C (97.7 °F) (Temporal)   Resp 18   Ht 1.676 m (5' 6\")   Wt 77.1 kg (170 lb)   SpO2 96%     Patient seen and examined  No acute distress  Breathing non labored  RRR  RLE: Dressing CDI. Patient clearly fires tibialis anterior, EHL, and gastrocnemius/soleus. Sensation is intact to light touch throughout superficial peroneal, deep peroneal, tibial, saphenous, and sural nerve distributions. Strong and palpable 2+ dorsalis pedis and posterior tibial pulses with capillary refill less than 2 seconds.     Recent Labs     07/23/25  0053 07/24/25  0604 07/25/25  0626   WBC 8.1 12.7* 10.1   RBC 4.17* 4.25 4.11*   HEMOGLOBIN 12.6 12.7 12.3   HEMATOCRIT 38.2 38.4 37.8   MCV 91.6 90.4 92.0   MCH 30.2 29.9 29.9   MCHC 33.0 33.1 32.5   RDW 46.1 43.9 46.6   PLATELETCT 226 231 243   MPV 9.6 9.2 9.4       Active Hospital Problems    Diagnosis     Vitamin D insufficiency [E55.9]      Priority: Medium    Closed trimalleolar fracture of right ankle, initial encounter [S82.851A]     Closed nondisplaced fracture of navicular bone of left foot [S92.255A]     Fall [W19.XXXA]     Normal anion gap metabolic acidosis [E87.20]        DX-PORTABLE FLUORO > 1 HOUR   Final Result      Portable fluoroscopy utilized for 29.1 seconds.         INTERPRETING LOCATION: [address] [facility], [address]      DX-ANKLE 2- VIEWS RIGHT   Final Result      Digitized intraoperative radiograph is submitted for review. This examination is not for diagnostic purpose but for guidance during a surgical procedure. Please see the patient's chart for full procedural details.         INTERPRETING LOCATION: [address] [facility], [address]      CT-FOOT " W/O PLUS RECONS LEFT   Final Result         1.  Trimalleolar fracture.   2.  Small bony fragment at the anterior superior aspect of the navicular bone, could represent small avulsion fracture fragment.   3.  Mildly comminuted fracture of the medial aspect of the navicular bone.      CT-ANKLE W/O PLUS RECONS RIGHT   Final Result         1.  Trimalleolar fracture.         DX-ANKLE 3+ VIEWS LEFT   Final Result         1.  Anterior superior navicular fracture         DX-ANKLE 3+ VIEWS RIGHT   Final Result         1.  Trimalleolar fracture         DX-KNEE 3 VIEWS RIGHT   Final Result         1.  No acute traumatic bony injury.      DX-TIBIA AND FIBULA LEFT   Final Result         1.  No acute traumatic bony injury.      DX-TIBIA AND FIBULA RIGHT   Final Result         1.  Trimalleolar fracture      DX-KNEE 3 VIEWS LEFT   Final Result         1.  No acute traumatic bony injury.          Assessment/Plan:  Patient doing well overall  R ankle dressings CDI  NWB RLE, WBAT LLE in boot  No pending ortho procedures  Cleared for DC from orthopedic standpoint     POD#2 S/p  Open reduction internal fixation right trimalleolar ankle fracture with fixation of the medial and lateral malleoli without fixation of the posterior lip.  Wt bearing status - NWB RLE, WBAT LLE in boot  Future Procedures - None planned   Wound care/Drains - Dressings to be changed every other day by nursing. Or PRN for saturation starting POD#2  Sutures/Staples out- 14-21 days post operatively. Removal will completed by ortho SUJATHA's unless transferred.  Inpatient DVT prophylaxis: Lovenox initiated 7/24  DVT Prophylaxis outpatient: ASA 81 mg PO BID x4 weeks  PT/OT-initiated  Antibiotics:  Perioperative completed  DVT Prophylaxis- TEDS/SCDs/Foot pumps  Case Coordination for Discharge Planning - Disposition per therapy recs.   Follow up with Dr. Atkins 2 weeks following final surgery for repeat imaging and wound check. (Est follow up date 8/6)

## 2025-07-25 NOTE — PROGRESS NOTES
Hospital Medicine Daily Progress Note    Date of Service  7/25/2025    Chief Complaint  R ankle fracture    Hospital Course  Cinthia Albarado is a 45 y.o. female without much past medical history, admitted 7/20/2025 with mechanical ground-level fall with subsequent bilateral lower extremity pain.  On evaluation, she was found to have trimalleolar right ankle fracture along with left anterior superior navicular fracture.  Bilateral knee x-rays did not show any acute abnormalities.  Otherwise labs were unremarkable.  Orthopedics was consulted recommended surgical repair.  Patient was placed on analgesics. Vitamin D level low at 23 for which she was started on vitamin D supplementation. Patient underwent ORIF of the right trimalleolar after fracture with fixation of the medial and lateral malleoli without fixation of the posterior lip on 7/23/2025. Orthopedics recommends nonweightbearing on the right lower extremity, weightbearing as tolerated on the left lower extremity in a cam boot, along with continued pharmacologic DVT prophylaxis.      Interval Problem Update  7/25/2025 - I reviewed the patient's chart today. Uneventful night. VSS. Afebrile. Saturating well on RA.  WBC count has normalized.  Hemoglobin is stable at 12.3.  She has been cleared for discharge from orthopedic standpoint with no further orthopedic procedures planned.  PT/OT recommend postacute placement.    > I have personally seen and examined the patient today.  She rates her pain at 9 out of 10 on the right ankle.  Passing gas but no bowel movement yet.  No nausea or vomiting.  No other GI complaints.  No chest pain or shortness of breath.  > I spent a significant amount of time discussing with the  and the patient about discharge planning as there were no accepting facilities given her limited insurance coverage (access to healthcare).   states that although they are able to provide assistance at home, it will be  difficult as she is unable to bear weight on the left leg due to her prior leg injury and now that she is nonweightbearing on the right leg as well.  I discussed with them that we will try to rehab her in place until she is able to manage weightbearing on the left leg.  I also discussed extensively with case management regarding discharge planning, and to look into leasing bed SNF placement.    I personally reviewed all lab results mentioned above. Prior medical records from this institution and outside facilities were independently reviewed as noted. I also personally reviewed all ER physician and consultant recommendations and plans as documented above. History was independently obtained by myself. I have discussed this patient's plan of care and discharge plan at IDT rounds today with Case Management, Nursing, Nursing leadership, and other members of the IDT team.    Consultants/Specialty  orthopedics    Code Status  Full Code    Disposition  The patient is medically cleared for discharge to home or a post-acute facility.      PT/OT recommend postacute placement.  SNF placement but no accepting facilities due to insurance.  May need to rehab in place.    I have placed the appropriate orders for post-discharge needs.    Review of Systems  ROS     Pertinent positives/negatives as mentioned above.     A complete review of systems was personally done by me. All other systems were negative.       Physical Exam  Temp:  [36.1 °C (97 °F)-36.5 °C (97.7 °F)] 36.5 °C (97.7 °F)  Pulse:  [67-86] 68  Resp:  [15-18] 18  BP: ()/(62-71) 96/62  SpO2:  [96 %] 96 %    Physical Exam  Constitutional:       Appearance: Normal appearance.   HENT:      Head: Normocephalic and atraumatic.      Right Ear: External ear normal.      Left Ear: External ear normal.      Mouth/Throat:      Mouth: Mucous membranes are moist.      Pharynx: Oropharynx is clear.   Eyes:      Pupils: Pupils are equal, round, and reactive to light.    Cardiovascular:      Rate and Rhythm: Normal rate and regular rhythm.   Pulmonary:      Effort: Pulmonary effort is normal.      Breath sounds: Normal breath sounds.   Abdominal:      Palpations: Abdomen is soft.   Musculoskeletal:      Comments: Left ankle boot in place  Right ankle surgical site intact, dressing CDI   Skin:     General: Skin is warm and dry.      Capillary Refill: Capillary refill takes less than 2 seconds.   Neurological:      General: No focal deficit present.      Mental Status: She is alert and oriented to person, place, and time.   Psychiatric:         Mood and Affect: Mood normal.         Behavior: Behavior normal.         Thought Content: Thought content normal.       I have performed the physical examination today 7/25/2025.  In review of yesterday's note, there are no new changes except as documented above.        Fluids  No intake or output data in the 24 hours ending 07/25/25 1158       Laboratory  Recent Labs     07/23/25  0053 07/24/25  0604 07/25/25  0626   WBC 8.1 12.7* 10.1   RBC 4.17* 4.25 4.11*   HEMOGLOBIN 12.6 12.7 12.3   HEMATOCRIT 38.2 38.4 37.8   MCV 91.6 90.4 92.0   MCH 30.2 29.9 29.9   MCHC 33.0 33.1 32.5   RDW 46.1 43.9 46.6   PLATELETCT 226 231 243   MPV 9.6 9.2 9.4     Recent Labs     07/23/25  0053 07/24/25  0604   SODIUM 136 139   POTASSIUM 5.0 4.7   CHLORIDE 102 106   CO2 24 22   GLUCOSE 121* 145*   BUN 11 9   CREATININE 0.69 0.62   CALCIUM 9.6 9.0                     Imaging      Assessment/Plan  * Closed trimalleolar fracture of right ankle, initial encounter- (present on admission)  Assessment & Plan  - Due to mechanical ground-level fall.  Imaging showed trimalleolar right ankle fracture along with left anterior superior navicular fracture.  - now s/p ORIF of the right trimalleolar after fracture with fixation of the medial and lateral malleoli without fixation of the posterior lip on 7/23/2025  - Continue vitamin D replacement for vitamin D insufficiency.  -  continue pain control with Tylenol, oral oxycodone and IV Dilaudid. Monitor for narcotic side effects particularly respiratory depression, AMS, and constipation.    -Monitor for ABLA postoperatively.  Check hemoglobin daily.  - Continue pharmacologic DVT prophylaxis while in the hospital.  Continue Lovenox SQ. Patient had low-risk below the knee procedure, with no prior history of VTE. Pt is Nonweightbearing on the affected extremity. Will need on-going DVT prophylaxis on discharge, can switch to ASA 81mg BID x 4 weeks on discharge.  -PT/OT recommend postacute placement.  Pursuing SNF placement but no accepting facilities due to insurance.  May need to rehab in place.  I discussed discharge planning with CM/ISHA.    Closed nondisplaced fracture of navicular bone of left foot- (present on admission)  Assessment & Plan  - Management as above.    Vitamin D insufficiency- (present on admission)  Assessment & Plan  - Continue cholecalciferol 5000 units daily.    Normal anion gap metabolic acidosis- (present on admission)  Assessment & Plan  - Resolved.  -Continue to monitor.  BMP in the morning.    Fall- (present on admission)  Assessment & Plan  - Discharge planning as above.         VTE prophylaxis: Lovenox SQ    My total time spent caring for the patient on the day of the encounter was 53 minutes. This does not include time spent on separately billable procedures/tests.

## 2025-07-26 ASSESSMENT — PAIN DESCRIPTION - PAIN TYPE
TYPE: ACUTE PAIN
TYPE: ACUTE PAIN
TYPE: ACUTE PAIN;SURGICAL PAIN
TYPE: ACUTE PAIN
TYPE: ACUTE PAIN;SURGICAL PAIN
TYPE: ACUTE PAIN
TYPE: ACUTE PAIN;SURGICAL PAIN

## 2025-07-26 ASSESSMENT — COGNITIVE AND FUNCTIONAL STATUS - GENERAL
TURNING FROM BACK TO SIDE WHILE IN FLAT BAD: A LITTLE
STANDING UP FROM CHAIR USING ARMS: A LOT
MOBILITY SCORE: 11
MOVING TO AND FROM BED TO CHAIR: TOTAL
CLIMB 3 TO 5 STEPS WITH RAILING: TOTAL
SUGGESTED CMS G CODE MODIFIER MOBILITY: CL
MOVING FROM LYING ON BACK TO SITTING ON SIDE OF FLAT BED: A LITTLE
WALKING IN HOSPITAL ROOM: TOTAL

## 2025-07-26 ASSESSMENT — GAIT ASSESSMENTS: GAIT LEVEL OF ASSIST: UNABLE TO PARTICIPATE

## 2025-07-26 NOTE — PROGRESS NOTES
Assumed care of patient, bedside shift report from Night RN complete. Patient A&Ox4, no complaints of pain, call light and belongings within reach, bed alarm is on.

## 2025-07-26 NOTE — PROGRESS NOTES
Hospital Medicine Daily Progress Note    Date of Service  7/26/2025    Chief Complaint  R ankle fracture    Hospital Course  Cinthia Albarado is a 45 y.o. female without much past medical history, admitted 7/20/2025 with mechanical ground-level fall with subsequent bilateral lower extremity pain.  On evaluation, she was found to have trimalleolar right ankle fracture along with left anterior superior navicular fracture.  Bilateral knee x-rays did not show any acute abnormalities.  Otherwise labs were unremarkable.  Orthopedics was consulted recommended surgical repair.  Patient was placed on analgesics. Vitamin D level low at 23 for which she was started on vitamin D supplementation. Patient underwent ORIF of the right trimalleolar after fracture with fixation of the medial and lateral malleoli without fixation of the posterior lip on 7/23/2025. Orthopedics recommends nonweightbearing on the right lower extremity, weightbearing as tolerated on the left lower extremity in a cam boot, along with continued pharmacologic DVT prophylaxis.  She has been cleared for discharge from orthopedic standpoint with no further orthopedic procedures planned.  PT/OT recommend postacute placement.  However due to her insurance (Access to healthcare), she is not able to be placed and may need to rehab in place or look into leased bed placement.  According to the , they are able to provide assistance at home, it will be difficult as she is unable to bear weight on the left leg due to her prior leg injury and now that she is nonweightbearing on the right leg as well.     Interval Problem Update  7/26/2025 - I reviewed the patient's chart. There were no significant overnight events. Remains hemodynamically stable and afebrile. Stable on RA.  No new labs.    > I have personally seen and examined the patient today.  Complains of 7/10 pain on the left foot, and 10/10 pain on the right foot.  Has some nausea but no  vomiting.  No abdominal pain.  No bowel movements.  Not short of breath.  Last documented BM was on 7/22.  Per nursing, had increased bloody and yellow drainage from the right side of the right ankle on dressing change. Ortho contacted about it, who gave the opinion that findings not too concerning and is expected.    I personally reviewed all lab results mentioned above. Prior medical records from this institution and outside facilities were independently reviewed as noted. I also personally reviewed all ER physician and consultant recommendations and plans as documented above. History was independently obtained by myself. I have discussed this patient's plan of care and discharge plan at IDT rounds today with Case Management, Nursing, Nursing leadership, and other members of the IDT team.    Consultants/Specialty  orthopedics    Code Status  Full Code    Disposition  The patient is medically cleared for discharge to home or a post-acute facility.      PT/OT recommend postacute placement.  SNF placement but no accepting facilities due to insurance.  May need to rehab in place.    I have placed the appropriate orders for post-discharge needs.    Review of Systems  ROS     Pertinent positives/negatives as mentioned above.     A complete review of systems was personally done by me. All other systems were negative.       Physical Exam  Temp:  [35.2 °C (95.4 °F)-36.4 °C (97.5 °F)] 36.4 °C (97.5 °F)  Pulse:  [62-78] 77  Resp:  [15-18] 18  BP: (104-130)/(60-78) 130/78  SpO2:  [93 %-96 %] 93 %    Physical Exam  Constitutional:       Appearance: Normal appearance.   HENT:      Head: Normocephalic and atraumatic.      Right Ear: External ear normal.      Left Ear: External ear normal.      Mouth/Throat:      Mouth: Mucous membranes are moist.      Pharynx: Oropharynx is clear.   Eyes:      Pupils: Pupils are equal, round, and reactive to light.   Cardiovascular:      Rate and Rhythm: Normal rate and regular rhythm.    Pulmonary:      Effort: Pulmonary effort is normal.      Breath sounds: Normal breath sounds.   Abdominal:      Palpations: Abdomen is soft.   Musculoskeletal:      Comments: Left ankle boot in place  Right ankle surgical site intact, dressing CDI   Skin:     General: Skin is warm and dry.      Capillary Refill: Capillary refill takes less than 2 seconds.   Neurological:      General: No focal deficit present.      Mental Status: She is alert and oriented to person, place, and time.   Psychiatric:         Mood and Affect: Mood normal.         Behavior: Behavior normal.         Thought Content: Thought content normal.       I have performed the physical examination today 7/26/2025.  In review of yesterday's note, there are no new changes except as documented above.        Fluids    Intake/Output Summary (Last 24 hours) at 7/26/2025 1203  Last data filed at 7/26/2025 0700  Gross per 24 hour   Intake --   Output 900 ml   Net -900 ml          Laboratory  Recent Labs     07/24/25  0604 07/25/25  0626   WBC 12.7* 10.1   RBC 4.25 4.11*   HEMOGLOBIN 12.7 12.3   HEMATOCRIT 38.4 37.8   MCV 90.4 92.0   MCH 29.9 29.9   MCHC 33.1 32.5   RDW 43.9 46.6   PLATELETCT 231 243   MPV 9.2 9.4     Recent Labs     07/24/25  0604   SODIUM 139   POTASSIUM 4.7   CHLORIDE 106   CO2 22   GLUCOSE 145*   BUN 9   CREATININE 0.62   CALCIUM 9.0                     Imaging      Assessment/Plan  * Closed trimalleolar fracture of right ankle, initial encounter- (present on admission)  Assessment & Plan  - Due to mechanical ground-level fall.  Imaging showed trimalleolar right ankle fracture along with left anterior superior navicular fracture.  - now s/p ORIF of the right trimalleolar after fracture with fixation of the medial and lateral malleoli without fixation of the posterior lip on 7/23/2025  - Continue vitamin D replacement for vitamin D insufficiency.  - continue pain control with Tylenol, oral oxycodone and IV Dilaudid. Monitor for narcotic  side effects particularly respiratory depression, AMS, and constipation.    -Monitor for ABLA postoperatively.  Check hemoglobin daily.  - Continue pharmacologic DVT prophylaxis while in the hospital.  Continue Lovenox SQ. Patient had low-risk below the knee procedure, with no prior history of VTE. Pt is Nonweightbearing on the affected extremity. Will need on-going DVT prophylaxis on discharge, can switch to ASA 81mg BID x 4 weeks on discharge.  -PT/OT recommend postacute placement.  MADISON/ISHA looked at SNF placement but no accepting facilities due to insurance.  Has good family support at home, but will need to rehab in place until able to bear weight on the left leg and go home with family support.  I discussed discharge planning with MADISON/ISHA.     Closed nondisplaced fracture of navicular bone of left foot- (present on admission)  Assessment & Plan  - Management as above.    Vitamin D insufficiency- (present on admission)  Assessment & Plan  - Continue cholecalciferol 5000 units daily.    Normal anion gap metabolic acidosis- (present on admission)  Assessment & Plan  - Resolved.  -Continue to monitor.  BMP in the morning.    Fall- (present on admission)  Assessment & Plan  - Discharge planning as above.         VTE prophylaxis: Lovenox SQ    My total time spent caring for the patient on the day of the encounter was 50 minutes. This does not include time spent on separately billable procedures/tests.

## 2025-07-26 NOTE — CARE PLAN
The patient is Stable - Low risk of patient condition declining or worsening    Shift Goals  Clinical Goals: pain control, rest  Patient Goals: pain control  Family Goals: updates on discharge    Progress made toward(s) clinical / shift goals:    Problem: Knowledge Deficit - Standard  Goal: Patient and family/care givers will demonstrate understanding of plan of care, disease process/condition, diagnostic tests and medications  Outcome: Progressing       Patient is not progressing towards the following goals:      Problem: Skin Integrity  Goal: Skin integrity is maintained or improved  Outcome: Not Progressing     Problem: Pain - Standard  Goal: Alleviation of pain or a reduction in pain to the patient’s comfort goal  Outcome: Not Progressing     Problem: Bowel Elimination  Goal: Establish and maintain regular bowel function  Outcome: Not Progressing     Problem: Mobility  Goal: Patient's capacity to carry out activities will improve  Outcome: Not Progressing     Problem: Self Care  Goal: Patient will have the ability to perform ADLs independently or with assistance (bathe, groom, dress, toilet and feed)  Outcome: Not Progressing     Problem: Infection - Standard  Goal: Patient will remain free from infection  Outcome: Not Progressing     Problem: Wound/ / Incision Healing  Goal: Patient's wound/surgical incision will decrease in size and heals properly  Outcome: Not Progressing

## 2025-07-26 NOTE — THERAPY
Physical Therapy   Daily Treatment     Patient Name:  Cinthia Albarado  Age:  45 y.o., Sex:  female  Medical Record #:  [id number]  Today's Date: 7/26/2025     Precautions  Medical: (P) Fall Risk  Orthopedic: (P) CAM Boot (LLE)  Weight Bearing: (P) Non Weight Bearing Right Lower Extremity, Weight Bearing as Tolerated Left Lower Extremity (WBAT LLE in CAM boot)    Assessment    Pt eager to work with PT today. She was able to stand twice with mod to max A, limited by pain in bilat ankles and lower legs. Pt was able to participate with basic supine isometric exercises, and some toe ext/flex bilat, though very limited excursion, stating pain. Pt will continue to benefit from skilled PT to address deficits. Recommend further PT in the post-acute setting prior to D/C home.    Plan    Treatment Plan Status: (P) Continue Current Treatment Plan  Type of Treatment: Bed Mobility, Gait Training, Neuro Re-Education / Balance, Self Care / Home Evaluation, Stair Training, Therapeutic Activities, Therapeutic Exercise  Treatment Frequency: 4 Times per Week  Treatment Duration: Until Therapy Goals Met    DC Equipment Recommendations: (P) Unable to determine at this time  Discharge Recommendations: (P) Recommend post-acute placement for additional physical therapy services prior to discharge home      Subjective    Pt stating she is able to sit EOB with feet dangling about 5 minutes before the pain is too great.     Objective       07/26/25 1607   Precautions   Medical Fall Risk   Orthopedic CAM Boot  (LLE)   Weight Bearing Non Weight Bearing Right Lower Extremity;Weight Bearing as Tolerated Left Lower Extremity  (WBAT LLE in CAM boot)   Pain 0 - 10 Group   Location Ankle;Leg   Location Orientation Lower;Right;Left   Description Cramping   Therapist Pain Assessment During Activity;Post Activity;Nurse Notified  (medicated prior to interventions; no specific NPRS value stated)   Non Verbal Descriptors   Non Verbal Scale   Grimacing;Moaning;Noisy Breathing;Restlessness   Cognition    Cognition / Consciousness WDL   Comments cooperative, motivated   Supine Lower Body Exercise   Supine Lower Body Exercises Yes   Gluteal Isometrics 1 set of 10;Bilateral   Quadriceps Isometrics 1 set of 10;Bilateral   Other Exercises toe ext/flex bilat, minimal excursion   Balance   Sitting Balance (Static) Fair   Sitting Balance (Dynamic) Fair   Standing Balance (Static) Poor +   Standing Balance (Dynamic) Poor   Weight Shift Sitting Fair   Weight Shift Standing Poor  (NWB RLE)   Skilled Intervention Postural Facilitation;Tactile Cuing;Verbal Cuing   Comments with FWW   Bed Mobility    Supine to Sit Standby Assist   Sit to Supine Moderate Assist   Scooting Standby Assist   Skilled Intervention Sequencing   Comments with bed rail   Gait Analysis   Gait Level Of Assist Unable to Participate   Weight Bearing Status NWB RLE, WBAT LLE in CAM boot   Comments limited by pain   Functional Mobility   Sit to Stand Maximal Assist   Mobility supine>sit EOB>stand twice->sit EOB->supine   Skilled Intervention Compensatory Strategies;Postural Facilitation;Sequencing;Verbal Cuing;Tactile Cuing   Comments limited by pain   6 Clicks Assessment - How much HELP from from another person do you currently need... (If the patient hasn't done an activity recently, how much help from another person do you think he/she would need if he/she tried?)   Turning from your back to your side while in a flat bed without using bedrails? 3   Moving from lying on your back to sitting on the side of a flat bed without using bedrails? 3   Moving to and from a bed to a chair (including a wheelchair)? 1   Standing up from a chair using your arms (e.g., wheelchair, or bedside chair)? 2   Walking in hospital room? 1   Climbing 3-5 steps with a railing? 1   6 clicks Mobility Score 11   Activity Tolerance   Sitting in Chair unable   Sitting Edge of Bed 5 min approx   Standing 5-10 sec x2 approx    Comments limited by pain   Short Term Goals    Short Term Goal # 1 Pt will be able to perform supine <> sit with SPV in 6 visits to progress bed mobility   Goal Outcome # 1 Progressing as expected   Short Term Goal # 2 Pt will be able to perform sit pivot transfer with SPV in 6 visits to progress functional transfers   Goal Outcome # 2 Goal not met   Short Term Goal # 3 Pt will be able to ambulate 25ft with FWW and SPV in 6 visits in order to navigate her bathroom at home   Goal Outcome # 3 Goal not met   Short Term Goal # 4 Pt will be able to self propel 150ft in WC in 6 visits to progress functional mobility   Goal Outcome # 4 Other (see comments)  (not addressed)   Short Term Goal # 5 Pt will be able to ascend/descend 6 stairs with CGA in 6 visits in order to safely navigate her home   Goal Outcome # 5 Goal not met   Education Group   Education Provided Exercises - Supine   Role of Physical Therapist Patient Response Patient;Family;Acceptance;Explanation;Verbal Demonstration;Action Demonstration   Physical Therapy Treatment Plan   Physical Therapy Treatment Plan Continue Current Treatment Plan   Anticipated Discharge Equipment and Recommendations   DC Equipment Recommendations Unable to determine at this time   Discharge Recommendations Recommend post-acute placement for additional physical therapy services prior to discharge home

## 2025-07-26 NOTE — PROGRESS NOTES
"    Orthopedic PA Progress Note    Interval changes:  Patient doing well. Pending SNF placement  WBC normal  R ankle dressings with scant serosanguineous drainage   - do NOT send wound culture unless ordered by orthopedics   - change daily with gauze, roll gauze, ace   NWB RLE, WBAT LLE in boot  No pending ortho procedures  Cleared for DC from orthopedic standpoint     ROS - Patient denies any new issues.  Denies any numbness or tingling. Pain controlled.    /78   Pulse 77   Temp (!) 35.7 °C (96.2 °F)   Resp 18   Ht 1.676 m (5' 6\")   Wt 77.1 kg (170 lb)   SpO2 93%     Patient seen and examined  No acute distress  Breathing non labored  RRR  RLE: Dressing minimal serosanguineous drainage. Patient clearly fires tibialis anterior, EHL, and gastrocnemius/soleus. Sensation is intact to light touch throughout superficial peroneal, deep peroneal, tibial, saphenous, and sural nerve distributions. Strong and palpable 2+ dorsalis pedis and posterior tibial pulses with capillary refill less than 2 seconds.     Recent Labs     07/24/25  0604 07/25/25  0626   WBC 12.7* 10.1   RBC 4.25 4.11*   HEMOGLOBIN 12.7 12.3   HEMATOCRIT 38.4 37.8   MCV 90.4 92.0   MCH 29.9 29.9   MCHC 33.1 32.5   RDW 43.9 46.6   PLATELETCT 231 243   MPV 9.2 9.4       Active Hospital Problems    Diagnosis     Vitamin D insufficiency [E55.9]      Priority: Medium    Closed trimalleolar fracture of right ankle, initial encounter [S82.851A]     Closed nondisplaced fracture of navicular bone of left foot [S92.255A]     Fall [W19.XXXA]     Normal anion gap metabolic acidosis [E87.20]        DX-PORTABLE FLUORO > 1 HOUR   Final Result      Portable fluoroscopy utilized for 29.1 seconds.         INTERPRETING LOCATION: [address] [place] [address], [facility]      DX-ANKLE 2- VIEWS RIGHT   Final Result      Digitized intraoperative radiograph is submitted for review. This examination is not for diagnostic purpose but for guidance during a surgical procedure. Please " see the patient's chart for full procedural details.         INTERPRETING LOCATION: [address] [facility], [place], [id number]      CT-FOOT W/O PLUS RECONS LEFT   Final Result         1.  Trimalleolar fracture.   2.  Small bony fragment at the anterior superior aspect of the navicular bone, could represent small avulsion fracture fragment.   3.  Mildly comminuted fracture of the medial aspect of the navicular bone.      CT-ANKLE W/O PLUS RECONS RIGHT   Final Result         1.  Trimalleolar fracture.         DX-ANKLE 3+ VIEWS LEFT   Final Result         1.  Anterior superior navicular fracture         DX-ANKLE 3+ VIEWS RIGHT   Final Result         1.  Trimalleolar fracture         DX-KNEE 3 VIEWS RIGHT   Final Result         1.  No acute traumatic bony injury.      DX-TIBIA AND FIBULA LEFT   Final Result         1.  No acute traumatic bony injury.      DX-TIBIA AND FIBULA RIGHT   Final Result         1.  Trimalleolar fracture      DX-KNEE 3 VIEWS LEFT   Final Result         1.  No acute traumatic bony injury.          Assessment/Plan:  Patient doing well. Pending SNF placement  WBC normal  R ankle dressings with scant serosanguineous drainage   - do NOT send wound culture unless ordered by orthopedics   - change daily with gauze, roll gauze, ace   NWB RLE, WBAT LLE in boot  No pending ortho procedures  Cleared for DC from orthopedic standpoint     POD#3 S/p  Open reduction internal fixation right trimalleolar ankle fracture with fixation of the medial and lateral malleoli without fixation of the posterior lip.  Wt bearing status - NWB RLE, WBAT LLE in boot  Future Procedures - None planned   Wound care/Drains - Dressings to be changed daily by nursing  Sutures/Staples out- 14-21 days post operatively. Removal will completed by ortho SUJATHA's unless transferred.  Inpatient DVT prophylaxis: Lovenox initiated 7/24  DVT Prophylaxis outpatient: ASA 81 mg PO BID x4 weeks  PT/OT-initiated  Antibiotics:  Perioperative completed  DVT  Prophylaxis- TEDS/SCDs/Foot pumps  Case Coordination for Discharge Planning - Disposition per therapy recs.   Follow up with Dr. Atkins 2 weeks following final surgery for repeat imaging and wound check. (Est follow up date 8/6)

## 2025-07-26 NOTE — PROGRESS NOTES
Patient had new complaints of lower back pain, and felt a little dizzy and light headed while sitting up in bed. VS were taken, BS was checked and resulted at 99. Patient was repositioned, linen changed, and dressing to the right ankle was changed. Moderate amount of serosanguinous dressing noted to the outer aspect of her right ankle. Patient stated she feels better after repositioning. MD and ortho PA notified.

## 2025-07-26 NOTE — CARE PLAN
The patient is Stable - Low risk of patient condition declining or worsening    Shift Goals  Clinical Goals: Pain control, rest  Patient Goals: rest, pain control  Family Goals: comfort    Progress made toward(s) clinical / shift goals:  Yes    Problem: Knowledge Deficit - Standard  Goal: Patient and family/care givers will demonstrate understanding of plan of care, disease process/condition, diagnostic tests and medications  Outcome: Progressing     Problem: Skin Integrity  Goal: Skin integrity is maintained or improved  Outcome: Progressing     Problem: Pain - Standard  Goal: Alleviation of pain or a reduction in pain to the patient’s comfort goal  Outcome: Progressing     Problem: Self Care  Goal: Patient will have the ability to perform ADLs independently or with assistance (bathe, groom, dress, toilet and feed)  Outcome: Progressing     Problem: Infection - Standard  Goal: Patient will remain free from infection  Outcome: Progressing     Problem: Wound/ / Incision Healing  Goal: Patient's wound/surgical incision will decrease in size and heals properly  Outcome: Progressing       Patient is not progressing towards the following goals:      Problem: Bowel Elimination  Goal: Establish and maintain regular bowel function  Outcome: Not Progressing     Problem: Mobility  Goal: Patient's capacity to carry out activities will improve  Outcome: Not Progressing

## 2025-07-26 NOTE — PROGRESS NOTES
Virtual Nurse rounding complete.    Round Needs: Patient requesting medication to have a BM. Notified primary RN. No other needs at this time.

## 2025-07-27 ASSESSMENT — PAIN DESCRIPTION - PAIN TYPE
TYPE: ACUTE PAIN;SURGICAL PAIN
TYPE: ACUTE PAIN;SURGICAL PAIN
TYPE: ACUTE PAIN
TYPE: ACUTE PAIN;SURGICAL PAIN
TYPE: ACUTE PAIN
TYPE: ACUTE PAIN;SURGICAL PAIN

## 2025-07-27 NOTE — CARE PLAN
The patient is Stable - Low risk of patient condition declining or worsening    Shift Goals  Clinical Goals: pain management, mobility  Patient Goals: pain control, rest  Family Goals: updates on POC    Progress made toward(s) clinical / shift goals:  Yes      Problem: Knowledge Deficit - Standard  Goal: Patient and family/care givers will demonstrate understanding of plan of care, disease process/condition, diagnostic tests and medications  Outcome: Progressing     Problem: Skin Integrity  Goal: Skin integrity is maintained or improved  Outcome: Progressing     Problem: Pain - Standard  Goal: Alleviation of pain or a reduction in pain to the patient’s comfort goal  Outcome: Progressing     Problem: Bowel Elimination  Goal: Establish and maintain regular bowel function  Outcome: Progressing     Problem: Infection - Standard  Goal: Patient will remain free from infection  Outcome: Progressing     Problem: Wound/ / Incision Healing  Goal: Patient's wound/surgical incision will decrease in size and heals properly  Outcome: Progressing       Patient is not progressing towards the following goals:      Problem: Mobility  Goal: Patient's capacity to carry out activities will improve  Outcome: Not Progressing     Problem: Self Care  Goal: Patient will have the ability to perform ADLs independently or with assistance (bathe, groom, dress, toilet and feed)  Outcome: Not Progressing

## 2025-07-27 NOTE — PROGRESS NOTES
Virtual Nurse rounding complete     Round Needs: No needs at this time. Pt states she has intermittent pain and nausea, but is not having either at time of visit. Also inquiring on discharge plan since she cannot ambulate on her own and does not have assistive devices. Informed per note by DC RN that wheelchair and discharge plan is still in progress, but will inform bedside RN and DC RN of patient concerns.     Notified of patient needs: Bedside RN and DC planning RN, Sadie Asif, via [employer]

## 2025-07-27 NOTE — DISCHARGE PLANNING
Case Management Discharge Planning    Admission Date: 7/20/2025  GMLOS: 2.7  ALOS: 6    6-Clicks ADL Score: 16  6-Clicks Mobility Score: 11        Anticipated Discharge Dispo: Discharge Disposition: Discharged to home/self care (01)      Action(s) Taken: RNCM participated in early AM rounds. Patient is rehabbing in place but could need a wheelchair to DC home with as she is non weight baring.     Escalations Completed: None    Medically Clear: Yes    Next Steps: Patient has access to healthcare. RNCM went to [email or web address] to see what they provide. Per website DME is listed as a services at a reduced rate. PT and HH are listed as well. RNCM Placed call to [phone], however they are not available today. RNCM will leave in hand off to follow up with [facility] [employer] on Monday to see if patient would be able to obtain a wheelchair through their services.     Barriers to Discharge: lack of insurance    Is the patient up for discharge tomorrow:     [address] Patient lives in a trailer with 6 stairs to enter. No wheelchair ramp or room for a wheelchair. Patient would benefit from leased bed if available. RNCM will send referrals to Jennifer and Maya for consideration.

## 2025-07-27 NOTE — CARE PLAN
The patient is Stable - Low risk of patient condition declining or worsening    Shift Goals  Clinical Goals: pain management, DC planning, PT/OT  Patient Goals: pain control  Family Goals: stay updated on POC    Progress made toward(s) clinical / shift goals:      Problem: Knowledge Deficit - Standard  Goal: Patient and family/care givers will demonstrate understanding of plan of care, disease process/condition, diagnostic tests and medications  Outcome: Progressing  Note: Pt aware of POC: pain management, DC planning, PT/OT. Goal is for pt to be updated in the moment.          Patient is not progressing towards the following goals:      Problem: Discharge Barriers/Planning  Goal: Patient's continuum of care needs will be met  Outcome: Not Progressing  Note: CM sent out SNF referrals, PT/OT rec post acute

## 2025-07-27 NOTE — PROGRESS NOTES
Hospital Medicine Daily Progress Note    Date of Service  7/27/2025    Chief Complaint  R ankle fracture    Hospital Course  Cinthia Albarado is a 45 y.o. female without much past medical history, admitted 7/20/2025 with mechanical ground-level fall with subsequent bilateral lower extremity pain.  On evaluation, she was found to have trimalleolar right ankle fracture along with left anterior superior navicular fracture.  Bilateral knee x-rays did not show any acute abnormalities.  Otherwise labs were unremarkable.  Orthopedics was consulted recommended surgical repair.  Patient was placed on analgesics. Vitamin D level low at 23 for which she was started on vitamin D supplementation. Patient underwent ORIF of the right trimalleolar after fracture with fixation of the medial and lateral malleoli without fixation of the posterior lip on 7/23/2025. Orthopedics recommends nonweightbearing on the right lower extremity, weightbearing as tolerated on the left lower extremity in a cam boot, along with continued pharmacologic DVT prophylaxis.  She has been cleared for discharge from orthopedic standpoint with no further orthopedic procedures planned.  PT/OT recommend postacute placement.  However due to her insurance ([employer]), she is not able to be placed and may need to rehab in place or look into leased bed placement.  According to the , they are able to provide assistance at home, it will be difficult as she is unable to bear weight on the left leg due to her prior leg injury and now that she is nonweightbearing on the right leg as well.     Interval Problem Update  7/27/2025 - I reviewed the patient's chart today. Uneventful night. VSS. Afebrile. Saturating well on RA.  No leukocytosis.  Hemoglobin is stable.  Electrolytes and renal function remain normal.  She worked with PT yesterday, requiring mod to max assistance with standing limited by pain in bilateral ankles and lower  legs.    > I have personally seen and examined the patient today.  Still has pain on the bilateral ankle, unable to bear weight on the left.  Feels dizzy when she tries to stand up.  No nausea or vomiting.  With intermittent abdominal cramping.  I had another extensive discussion with the patient and  with the help of the charge nurse (Tameka) interpreting to discuss discharge planning. They state she does not have much support at home once  will need to go back to work next week.  They also live in a trailer which is not accessible to wheelchair.    I personally reviewed all lab results mentioned above. Prior medical records from this institution and outside facilities were independently reviewed as noted. I also personally reviewed all ER physician and consultant recommendations and plans as documented above. History was independently obtained by myself. I have discussed this patient's plan of care and discharge plan at IDT rounds today with Case Management, Nursing, Nursing leadership, and other members of the IDT team.    Consultants/Specialty  orthopedics    Code Status  Full Code    Disposition  The patient is medically cleared for discharge to home or a post-acute facility.      PT/OT recommend postacute placement.  SNF placement but no accepting facilities due to insurance ([employer]).  May need to rehab in place.  Look into leased bed SNF placement.    I have placed the appropriate orders for post-discharge needs.    Review of Systems  ROS     Pertinent positives/negatives as mentioned above.     A complete review of systems was personally done by me. All other systems were negative.       Physical Exam  Temp:  [35.2 °C (95.4 °F)-36.4 °C (97.5 °F)] 35.8 °C (96.5 °F)  Pulse:  [69-83] 69  Resp:  [15-18] 18  BP: (106-130)/(52-78) 106/52  SpO2:  [92 %-96 %] 95 %    Physical Exam  Constitutional:       Appearance: Normal appearance.   HENT:      Head: Normocephalic and atraumatic.       Right Ear: External ear normal.      Left Ear: External ear normal.      Mouth/Throat:      Mouth: Mucous membranes are moist.      Pharynx: Oropharynx is clear.   Eyes:      Pupils: Pupils are equal, round, and reactive to light.   Cardiovascular:      Rate and Rhythm: Normal rate and regular rhythm.   Pulmonary:      Effort: Pulmonary effort is normal.      Breath sounds: Normal breath sounds.   Abdominal:      Palpations: Abdomen is soft.   Musculoskeletal:      Comments: Left ankle boot in place  Right ankle surgical site intact, dressing CDI   Skin:     General: Skin is warm and dry.      Capillary Refill: Capillary refill takes less than 2 seconds.   Neurological:      General: No focal deficit present.      Mental Status: She is alert and oriented to person, place, and time.   Psychiatric:         Mood and Affect: Mood normal.         Behavior: Behavior normal.         Thought Content: Thought content normal.       I have performed the physical examination today 7/27/2025.  In review of yesterday's note, there are no new changes except as documented above.        Fluids    Intake/Output Summary (Last 24 hours) at 7/27/2025 1029  Last data filed at 7/27/2025 0532  Gross per 24 hour   Intake --   Output 1600 ml   Net -1600 ml          Laboratory  Recent Labs     07/25/25  0626 07/27/25  0456   WBC 10.1 9.7   RBC 4.11* 4.02*   HEMOGLOBIN 12.3 12.1   HEMATOCRIT 37.8 35.8*   MCV 92.0 89.1   MCH 29.9 30.1   MCHC 32.5 33.8   RDW 46.6 44.3   PLATELETCT 243 238   MPV 9.4 9.2     Recent Labs     07/27/25  0456   SODIUM 136   POTASSIUM 4.5   CHLORIDE 103   CO2 23   GLUCOSE 108*   BUN 12   CREATININE 0.70   CALCIUM 8.9                     Imaging      Assessment/Plan  * Closed trimalleolar fracture of right ankle, initial encounter- (present on admission)  Assessment & Plan  - Due to mechanical ground-level fall.  Imaging showed trimalleolar right ankle fracture along with left anterior superior navicular fracture.  -  now s/p ORIF of the right trimalleolar after fracture with fixation of the medial and lateral malleoli without fixation of the posterior lip on 7/23/2025  - Continue vitamin D replacement for vitamin D insufficiency.  - continue pain control with Tylenol, oral oxycodone and IV Dilaudid. Monitor for narcotic side effects particularly respiratory depression, AMS, and constipation.    -Monitor for ABLA postoperatively.  Check hemoglobin daily.  - Continue pharmacologic DVT prophylaxis while in the hospital.  Continue Lovenox SQ. Patient had low-risk below the knee procedure, with no prior history of VTE. Pt is Nonweightbearing on the affected extremity. Will need on-going DVT prophylaxis on discharge, can switch to ASA 81mg BID x 4 weeks on discharge.  -PT/OT recommend postacute placement.  MADISON/ISHA looked at SNF placement but no accepting facilities due to insurance.  She will have limited support at home when  goes back to work.  They also live in a trailer which is not accessible to wheelchair.  Discharge options include rehab in place vs leased bed SNF placement.  I discussed discharge planning with MADISON/ISHA.     Closed nondisplaced fracture of navicular bone of left foot- (present on admission)  Assessment & Plan  - Management as above.    Vitamin D insufficiency- (present on admission)  Assessment & Plan  - Continue cholecalciferol 5000 units daily.    Normal anion gap metabolic acidosis- (present on admission)  Assessment & Plan  - Resolved.  -Continue to monitor.  BMP in the morning.    Fall- (present on admission)  Assessment & Plan  - Discharge planning as above.         VTE prophylaxis: Lovenox SQ    My total time spent caring for the patient on the day of the encounter was 51 minutes. This does not include time spent on separately billable procedures/tests.

## 2025-07-27 NOTE — PROGRESS NOTES
"    Orthopedic PA Progress Note    Interval changes:  Patient doing well. Pending SNF placement  WBC normal  R ankle dressings CDI at this time   - change daily with gauze, roll gauze, ace   NWB RLE, WBAT LLE in boot  No pending ortho procedures  Cleared for DC from orthopedic standpoint     ROS - Patient denies any new issues.  Denies any numbness or tingling. Pain controlled.    /52   Pulse 69   Temp 35.8 °C (96.5 °F) (Temporal)   Resp 18   Ht 1.676 m (5' 6\")   Wt 77.1 kg (170 lb)   SpO2 95%     Patient seen and examined  No acute distress  Breathing non labored  RRR  RLE: Dressing CDI. Patient clearly fires tibialis anterior, EHL, and gastrocnemius/soleus. Sensation is intact to light touch throughout superficial peroneal, deep peroneal, tibial, saphenous, and sural nerve distributions. Strong and palpable 2+ dorsalis pedis and posterior tibial pulses with capillary refill less than 2 seconds.     Recent Labs     07/25/25  0626 07/27/25  0456   WBC 10.1 9.7   RBC 4.11* 4.02*   HEMOGLOBIN 12.3 12.1   HEMATOCRIT 37.8 35.8*   MCV 92.0 89.1   MCH 29.9 30.1   MCHC 32.5 33.8   RDW 46.6 44.3   PLATELETCT 243 238   MPV 9.4 9.2       Active Hospital Problems    Diagnosis     Vitamin D insufficiency [E55.9]      Priority: Medium    Closed trimalleolar fracture of right ankle, initial encounter [S82.851A]     Closed nondisplaced fracture of navicular bone of left foot [S92.255A]     Fall [W19.XXXA]     Normal anion gap metabolic acidosis [E87.20]        DX-PORTABLE FLUORO > 1 HOUR   Final Result      Portable fluoroscopy utilized for 29.1 seconds.         [facility] [address]      DX-ANKLE 2- VIEWS RIGHT   Final Result      Digitized intraoperative radiograph is submitted for review. This examination is not for diagnostic purpose but for guidance during a surgical procedure. Please see the patient's chart for full procedural details.         INTERPRETING LOCATION: [address] [place] [address], " [place], [id number]      CT-FOOT W/O PLUS RECONS LEFT   Final Result         1.  Trimalleolar fracture.   2.  Small bony fragment at the anterior superior aspect of the navicular bone, could represent small avulsion fracture fragment.   3.  Mildly comminuted fracture of the medial aspect of the navicular bone.      CT-ANKLE W/O PLUS RECONS RIGHT   Final Result         1.  Trimalleolar fracture.         DX-ANKLE 3+ VIEWS LEFT   Final Result         1.  Anterior superior navicular fracture         DX-ANKLE 3+ VIEWS RIGHT   Final Result         1.  Trimalleolar fracture         DX-KNEE 3 VIEWS RIGHT   Final Result         1.  No acute traumatic bony injury.      DX-TIBIA AND FIBULA LEFT   Final Result         1.  No acute traumatic bony injury.      DX-TIBIA AND FIBULA RIGHT   Final Result         1.  Trimalleolar fracture      DX-KNEE 3 VIEWS LEFT   Final Result         1.  No acute traumatic bony injury.          Assessment/Plan:  Patient doing well. Pending SNF placement  WBC normal  R ankle dressings CDI at this time   - change daily with gauze, roll gauze, ace   NWB RLE, WBAT LLE in boot  No pending ortho procedures  Cleared for DC from orthopedic standpoint      POD#4 S/p  Open reduction internal fixation right trimalleolar ankle fracture with fixation of the medial and lateral malleoli without fixation of the posterior lip.  Wt bearing status - NWB RLE, WBAT LLE in boot  Future Procedures - None planned   Wound care/Drains - Dressings to be changed daily by nursing  Sutures/Staples out- 14-21 days post operatively. Removal will completed by ortho SUJATHA's unless transferred.  Inpatient DVT prophylaxis: Lovenox initiated 7/24  DVT Prophylaxis outpatient: ASA 81 mg PO BID x4 weeks  PT/OT-initiated  Antibiotics:  Perioperative completed  DVT Prophylaxis- TEDS/SCDs/Foot pumps  Case Coordination for Discharge Planning - Disposition per therapy recs.   Follow up with Dr. Atkins 2 weeks following final surgery for repeat  imaging and wound check. (Est follow up date 8/6)

## 2025-07-27 NOTE — PROGRESS NOTES
Received report, assumed pt care. Pt asleep without any signs of distress. Resting comfortably in bed with call light, bedside table in reach. No c/o at this time. Side rails up 2. Instructed to use call light when needing to get OOB/assistance verbalized understanding. Bed alarm on, bed in low position. Will continue to monitor.     Family at the bedside

## 2025-07-28 PROBLEM — F41.8 SITUATIONAL ANXIETY: Status: ACTIVE | Noted: 2025-07-28

## 2025-07-28 PROBLEM — F41.9 ANXIETY: Status: ACTIVE | Noted: 2025-07-28

## 2025-07-28 ASSESSMENT — PAIN DESCRIPTION - PAIN TYPE
TYPE: ACUTE PAIN;SURGICAL PAIN

## 2025-07-28 NOTE — PROGRESS NOTES
Virtual Nurse rounding complete.    Round Needs: Patient had questions regarding emergency Medicaid. Notified GEORGIE Hoover. Informed by GEORGIE Hoover that he will notify PFA. Patient had no other needs at this time.

## 2025-07-28 NOTE — CARE PLAN
The patient is Stable - Low risk of patient condition declining or worsening    Shift Goals  Clinical Goals: pain control, DC plan, mobility  Patient Goals: pain control  Family Goals: NA    Progress made toward(s) clinical / shift goals:    Problem: Knowledge Deficit - Standard  Goal: Patient and family/care givers will demonstrate understanding of plan of care, disease process/condition, diagnostic tests and medications  Outcome: Progressing     Problem: Skin Integrity  Goal: Skin integrity is maintained or improved  Outcome: Progressing     Problem: Pain - Standard  Goal: Alleviation of pain or a reduction in pain to the patient’s comfort goal  Outcome: Progressing  Note: Paitient educated on 0 to 10 pain scale. Pain managed with pharmacologic and non-pharmacological interventions. See MAR. Pt verbilized understanding of interventions.      Problem: Bowel Elimination  Goal: Establish and maintain regular bowel function  Outcome: Progressing     Problem: Mobility  Goal: Patient's capacity to carry out activities will improve  Outcome: Progressing  Note: Pt educated on early ambulation post surgery and use of call light for assistance. Pt was assessed for proper DME usage and amount of assistance. Pt verbalized understanding.     Problem: Self Care  Goal: Patient will have the ability to perform ADLs independently or with assistance (bathe, groom, dress, toilet and feed)  Outcome: Progressing     Problem: Infection - Standard  Goal: Patient will remain free from infection  Outcome: Progressing  Note: Pt educated on signs of infection. Pt verbalizes understanding. Dressing change(s) completed.     Problem: Wound/ / Incision Healing  Goal: Patient's wound/surgical incision will decrease in size and heals properly  Outcome: Progressing     Problem: Discharge Barriers/Planning  Goal: Patient's continuum of care needs will be met  Outcome: Progressing       Patient is not progressing towards the following goals:

## 2025-07-28 NOTE — DISCHARGE PLANNING
DC Transport Scheduled    Transport Company Scheduled:  [facility]    Scheduled Date: 7/28/2025  Scheduled Time: 1700    Transport Type: [facility]  Destination: [facility]   Destination address: [address] [place] [id number]    Notified care team of scheduled transport via Voalte.     If there are any changes needed to the DC transportation scheduled, please contact [facility] Line at ext. [phone] between the hours of 5540-9184. If outside those hours, contact the ED Case Manager at ext. [phone].

## 2025-07-28 NOTE — DISCHARGE PLANNING
Case Management Discharge Planning    [facility] # [id number]      Admission Date: 7/20/2025  GMLOS: 2.7  ALOS: 7    6-Clicks ADL Score: 16  6-Clicks Mobility Score: 11  PT and/or OT Eval ordered: Yes  Post-acute Referrals Ordered: Yes  Post-acute Choice Obtained: Yes  Has referral(s) been sent to post-acute provider:  Yes      Anticipated Discharge Dispo: Discharge Disposition: Discharged to home/self care (01)    DME Needed: No    Action(s) Taken: Updated Provider/Nurse on Discharge Plan, Choice obtained, and Transport Arranged - Patient discussed during IDT rounds with medical team. Lease bed available at [facility], Transport set up for 1530, spoke to  and spouse via .    Escalations Completed: Ride Line    Medically Clear: Yes    Next Steps: Case Management will continue to follow for discharge planning needs.     Barriers to Discharge: None    Is the patient up for discharge tomorrow: No    1400  Earliest time for transport is 1700, patient and SNF aware.

## 2025-07-28 NOTE — CARE PLAN
The patient is Stable - Low risk of patient condition declining or worsening    Shift Goals  Clinical Goals: pain management, DC planning, PT/OT  Patient Goals: pain control  Family Goals: stay updated on POC    Progress made toward(s) clinical / shift goals:    Problem: Knowledge Deficit - Standard  Goal: Patient and family/care givers will demonstrate understanding of plan of care, disease process/condition, diagnostic tests and medications  Outcome: Progressing     Problem: Skin Integrity  Goal: Skin integrity is maintained or improved  Outcome: Progressing     Problem: Pain - Standard  Goal: Alleviation of pain or a reduction in pain to the patient’s comfort goal  Outcome: Progressing     Problem: Bowel Elimination  Goal: Establish and maintain regular bowel function  Outcome: Progressing     Problem: Mobility  Goal: Patient's capacity to carry out activities will improve  Outcome: Progressing     Problem: Self Care  Goal: Patient will have the ability to perform ADLs independently or with assistance (bathe, groom, dress, toilet and feed)  Outcome: Progressing     Problem: Infection - Standard  Goal: Patient will remain free from infection  Outcome: Progressing     Problem: Wound/ / Incision Healing  Goal: Patient's wound/surgical incision will decrease in size and heals properly  Outcome: Progressing     Problem: Discharge Barriers/Planning  Goal: Patient's continuum of care needs will be met  Outcome: Progressing       Patient is not progressing towards the following goals:

## 2025-07-28 NOTE — DISCHARGE SUMMARY
Discharge Summary    CHIEF COMPLAINT ON ADMISSION  Chief Complaint   Patient presents with    T-5000 GLF     Pt said she fell from the trailer  (not moving) since 20 minutes ago  + she hit her both legs - causing pain    Denied any head injury, loss of consciousness or taking blood thinners       Reason for Admission  t-5000    Admission Date  7/20/2025     CODE STATUS  Full Code    HPI & HOSPITAL COURSE  Cinthia Albarado is a 45 y.o. female without much past medical history, admitted 7/20/2025 with mechanical ground-level fall with subsequent bilateral lower extremity pain.  On evaluation, she was found to have trimalleolar right ankle fracture along with left anterior superior navicular fracture.  Bilateral knee x-rays did not show any acute abnormalities.  Otherwise labs were unremarkable.  Orthopedics was consulted recommended surgical repair.  Patient was placed on analgesics. Vitamin D level low at 23 for which she was started on vitamin D supplementation. Patient underwent ORIF of the right trimalleolar after fracture with fixation of the medial and lateral malleoli without fixation of the posterior lip on 7/23/2025. Orthopedics recommends nonweightbearing on the right lower extremity, weightbearing as tolerated on the left lower extremity in a cam boot, along with continued pharmacologic DVT prophylaxis.  She has been cleared for discharge from orthopedic standpoint with no further orthopedic procedures planned.  PT/OT recommend postacute placement and SNF placement was pursued.      She remained hemodynamically stable and afebrile.  WBC count remained normal.  Hemoglobin remained stable.  Electrolytes and renal function remain normal.     I have personally seen and examined the patient on the day of discharge. With her clinical improvement, she was deemed ready to discharge from the hospital as she did not have any further hospitalization needs. Patient felt comfortable going to the SNF. The  discharge plan was discussed with the patient, with which she was agreeable to.     Therefore, she is discharged in good and stable condition to skilled nursing facility.    The patient met 2-midnight criteria for an inpatient stay at the time of discharge.      FOLLOW UP ITEMS POST DISCHARGE  -Continue PT/OT at the SNF.  Nonweightbearing on the right lower extremity.  - Continue pain control with oral oxycodone.  She is consented to the narcotic prescription.  - Continue pharmacologic DVT prophylaxis with Lovenox SQ.  - Continue bowel regimen.  - SNF physician to follow for continued management of ongoing/chronic medical issues.   - counseled to seek immediate medical attention, or return to the ED for recurrent or worsening symptoms.      DISCHARGE DIAGNOSES  Principal Problem:    Closed trimalleolar fracture of right ankle, initial encounter (POA: Yes)  Active Problems:    Closed nondisplaced fracture of navicular bone of left foot (POA: Yes)    Fall (POA: Yes)    Normal anion gap metabolic acidosis (POA: Yes)    Vitamin D insufficiency (POA: Yes)    Situational anxiety (POA: No)  Resolved Problems:    * No resolved hospital problems. *      FOLLOW UP  No future appointments.  Barry Fraser M.D.  [address] [facility] [id number] [phone]    Schedule an appointment as soon as possible for a visit in 1 day      [facility], Emergency Dept  [address] [facility] [address] [phone]    As needed, If symptoms worsen    [facility]  [address] [place] [id number] [phone]          MEDICATIONS ON DISCHARGE     Medication List        START taking these medications        Instructions   acetaminophen 500 MG Tabs  Commonly known as: Tylenol   Take 2 Tablets by mouth every 6 hours as needed for Mild Pain.  Dose: 1,000 mg     bisacodyl 10 MG Supp  Commonly known as: Dulcolax   Insert 1 Suppository into the rectum 1 time a day as needed (if milk of mag is  ineffective).  Dose: 10 mg     enoxaparin 40 MG/0.4ML Sosy inj  Commonly known as: Lovenox   Inject 40 mg under the skin every day at 6 PM.  Dose: 40 mg     hydrOXYzine HCl 50 MG Tabs  Commonly known as: Atarax   Take 1 Tablet by mouth every four hours as needed for Anxiety.  Dose: 50 mg     oxyCODONE immediate release 10 MG immediate release tablet  Commonly known as: Roxicodone   Take 0.5-1 Tablets by mouth every four hours as needed for Moderate Pain or Severe Pain for up to 14 days.  Dose: 5-10 mg     polyethylene glycol/lytes 17 g Pack  Commonly known as: Miralax   Take 1 Packet by mouth 1 time a day as needed (if no bowel movement in last 2 days).  Dose: 17 g     senna-docusate 8.6-50 MG Tabs  Commonly known as: Pericolace Or Senokot S   Take 2 Tablets by mouth every evening.  Dose: 2 Tablet     Vitamin D3 25 MCG Tabs   Take 5 Tablets by mouth every day.  Dose: 5,000 Units            CONTINUE taking these medications        Instructions   ferrous sulfate 325 (65 Fe) MG tablet   Take 325 mg by mouth every day.  Dose: 325 mg              Allergies  Allergies[1]    DIET  Orders Placed This Encounter   Procedures    Diet Order Diet: Regular     Standing Status:   Standing     Number of Occurrences:   1     Diet::   Regular [1]       ACTIVITY  As tolerated and directed by skilled nursing.  Non-weight bearing RIGHT leg    LINES, DRAINS, AND WOUNDS  This is an automated list. Peripheral IVs will be removed prior to discharge.  Peripheral IV 07/26/25 20 G Anterior;Right Forearm (Active)   Site Assessment Clean;Dry;Intact 07/28/25 1000   Dressing Type Transparent Film 07/28/25 1000   Line Status Saline locked 07/28/25 1000   Dressing Status Clean;Dry;Intact 07/28/25 1000   Dressing Intervention N/A 07/27/25 1955   Infiltration Grading ([facility], [facility]) 0 07/28/25 1000   Phlebitis Scale ([facility] Only) 0 07/28/25 1000     External Urinary Catheter ([facility]) (Active)   Collection Container Suction container 07/27/25 7247    Output (mL) 300 mL 07/27/25 1222     Supraglottic Airway 4 (Active)     Wound 07/23/25 Surgical Closed Surgical Incision Tibia Distal;Lateral Right (Active)   Site Assessment [facility] 07/28/25 1000   Periwound Assessment [facility] 07/28/25 1000   Margins XIANG 07/28/25 1000   Closure XIANG 07/28/25 1000   Drainage Amount XIANG 07/28/25 1000   Drainage Description XIANG 07/28/25 1000   Treatments Cleansed;Offloading;Site care 07/26/25 1154   Wound Cleansing Normal Saline Irrigation 07/26/25 1154   Dressing Status Clean;Dry;Intact 07/28/25 1000   Dressing Changed Observed 07/28/25 1000   Dressing Options Ace Wrap;Dry Gauze;Petrolatum Gauze (yellow) 07/28/25 1000   Dressing Change/Treatment Frequency Every 48 hrs, and As Needed 07/28/25 1000       Peripheral IV 07/26/25 20 G Anterior;Right Forearm (Active)   Site Assessment Clean;Dry;Intact 07/28/25 1000   Dressing Type Transparent Film 07/28/25 1000   Line Status Saline locked 07/28/25 1000   Dressing Status Clean;Dry;Intact 07/28/25 1000   Dressing Intervention N/A 07/27/25 1955   Infiltration Grading ([facility], [facility]) 0 07/28/25 1000   Phlebitis Scale ([facility] Only) 0 07/28/25 1000               MENTAL STATUS ON TRANSFER      AOx3       CONSULTATIONS  Orthopedics    PROCEDURES  As above    LABORATORY  Lab Results   Component Value Date    SODIUM 136 07/27/2025    POTASSIUM 4.5 07/27/2025    CHLORIDE 103 07/27/2025    CO2 23 07/27/2025    GLUCOSE 108 (H) 07/27/2025    BUN 12 07/27/2025    CREATININE 0.70 07/27/2025        Lab Results   Component Value Date    WBC 9.7 07/27/2025    HEMOGLOBIN 12.1 07/27/2025    HEMATOCRIT 35.8 (L) 07/27/2025    PLATELETCT 238 07/27/2025        Total time of the discharge process = 38 minutes.       [1] No Known Allergies

## 2025-07-28 NOTE — PROGRESS NOTES
Hospital Medicine Daily Progress Note    Date of Service  7/28/2025    Chief Complaint  R ankle fracture    Hospital Course  Cinthia Albarado is a 45 y.o. female without much past medical history, admitted 7/20/2025 with mechanical ground-level fall with subsequent bilateral lower extremity pain.  On evaluation, she was found to have trimalleolar right ankle fracture along with left anterior superior navicular fracture.  Bilateral knee x-rays did not show any acute abnormalities.  Otherwise labs were unremarkable.  Orthopedics was consulted recommended surgical repair.  Patient was placed on analgesics. Vitamin D level low at 23 for which she was started on vitamin D supplementation. Patient underwent ORIF of the right trimalleolar after fracture with fixation of the medial and lateral malleoli without fixation of the posterior lip on 7/23/2025. Orthopedics recommends nonweightbearing on the right lower extremity, weightbearing as tolerated on the left lower extremity in a cam boot, along with continued pharmacologic DVT prophylaxis.  She has been cleared for discharge from orthopedic standpoint with no further orthopedic procedures planned.  PT/OT recommend postacute placement.  However due to her insurance ([employer]), she is not able to be placed and may need to rehab in place or look into leased bed placement.  According to the , she has limited assistance at home as most of family go to work and they live in a trailer which is not accessible to wheelchair. It will also be difficult as she is unable to bear weight on the left leg due to her prior leg injury and now that she is nonweightbearing on the right leg as well. She worked with PT while in-house, continues to requiring mod to max assistance with standing limited by pain in bilateral ankles and lower legs.    Interval Problem Update  7/28/2025 - I reviewed the patient's chart. There were no significant overnight events.  Remains hemodynamically stable and afebrile. Stable on RA.  No new labs.  She had issues with anxiety, for which she was started on Atarax.    > I have personally seen and examined the patient today.  She rates her pain on the right leg at 8 out of 10.  Tolerating oral diet.  No nausea or vomiting.  Had a bowel movement last night.  Felt anxious yesterday yesterday, better today.    I personally reviewed all lab results mentioned above. Prior medical records from this institution and outside facilities were independently reviewed as noted. I also personally reviewed all ER physician and consultant recommendations and plans as documented above. History was independently obtained by myself. I have discussed this patient's plan of care and discharge plan at IDT rounds today with Case Management, Nursing, Nursing leadership, and other members of the IDT team.    Consultants/Specialty  orthopedics    Code Status  Full Code    Disposition  The patient is medically cleared for discharge to home or a post-acute facility.      PT/OT recommend postacute placement.  SNF placement but no accepting facilities due to insurance ([employer]).  May need to rehab in place.  Look into leased bed SNF placement.    I have placed the appropriate orders for post-discharge needs.    Review of Systems  ROS     Pertinent positives/negatives as mentioned above.     A complete review of systems was personally done by me. All other systems were negative.       Physical Exam  Temp:  [36.1 °C (96.9 °F)-36.3 °C (97.4 °F)] 36.3 °C (97.3 °F)  Pulse:  [66-79] 71  Resp:  [15-17] 16  BP: (104-117)/(46-70) 111/70  SpO2:  [91 %-96 %] 91 %    Physical Exam  Constitutional:       Appearance: Normal appearance.   HENT:      Head: Normocephalic and atraumatic.      Right Ear: External ear normal.      Left Ear: External ear normal.      Mouth/Throat:      Mouth: Mucous membranes are moist.      Pharynx: Oropharynx is clear.   Eyes:      Pupils:  Pupils are equal, round, and reactive to light.   Cardiovascular:      Rate and Rhythm: Normal rate and regular rhythm.   Pulmonary:      Effort: Pulmonary effort is normal.      Breath sounds: Normal breath sounds.   Abdominal:      Palpations: Abdomen is soft.   Musculoskeletal:      Comments: Left ankle boot in place  Right ankle surgical site intact, dressing CDI   Skin:     General: Skin is warm and dry.      Capillary Refill: Capillary refill takes less than 2 seconds.   Neurological:      General: No focal deficit present.      Mental Status: She is alert and oriented to person, place, and time.   Psychiatric:         Mood and Affect: Mood normal.         Behavior: Behavior normal.         Thought Content: Thought content normal.       I have performed the physical examination today 7/28/2025.  In review of yesterday's note, there are no new changes except as documented above.        Fluids    Intake/Output Summary (Last 24 hours) at 7/28/2025 1041  Last data filed at 7/27/2025 1222  Gross per 24 hour   Intake --   Output 300 ml   Net -300 ml          Laboratory  Recent Labs     07/27/25  0456   WBC 9.7   RBC 4.02*   HEMOGLOBIN 12.1   HEMATOCRIT 35.8*   MCV 89.1   MCH 30.1   MCHC 33.8   RDW 44.3   PLATELETCT 238   MPV 9.2     Recent Labs     07/27/25  0456   SODIUM 136   POTASSIUM 4.5   CHLORIDE 103   CO2 23   GLUCOSE 108*   BUN 12   CREATININE 0.70   CALCIUM 8.9                     Imaging      Assessment/Plan  * Closed trimalleolar fracture of right ankle, initial encounter- (present on admission)  Assessment & Plan  - Due to mechanical ground-level fall.  Imaging showed trimalleolar right ankle fracture along with left anterior superior navicular fracture.  - now s/p ORIF of the right trimalleolar after fracture with fixation of the medial and lateral malleoli without fixation of the posterior lip on 7/23/2025  - Continue vitamin D replacement for vitamin D insufficiency.  - continue pain control with  Tylenol, oral oxycodone and IV Dilaudid. Monitor for narcotic side effects particularly respiratory depression, AMS, and constipation.    -Monitor for ABLA postoperatively.  Check hemoglobin daily.  - Continue pharmacologic DVT prophylaxis while in the hospital.  Continue Lovenox SQ. Patient had low-risk below the knee procedure, with no prior history of VTE. Pt is Nonweightbearing on the affected extremity. Will need on-going DVT prophylaxis on discharge, can switch to ASA 81mg BID x 4 weeks on discharge.  -PT/OT recommend postacute placement.  MADISON/ISHA looked at SNF placement but no accepting facilities due to insurance.  She will have limited support at home when  goes back to work.  They also live in a trailer which is not accessible to wheelchair.  Discharge options include rehab in place vs leased bed SNF placement.  I discussed discharge planning with ELE[facility].     Closed nondisplaced fracture of navicular bone of left foot- (present on admission)  Assessment & Plan  - Management as above.    Situational anxiety  Assessment & Plan  - Start as needed Atarax.    Vitamin D insufficiency- (present on admission)  Assessment & Plan  - Continue cholecalciferol 5000 units daily.    Normal anion gap metabolic acidosis- (present on admission)  Assessment & Plan  - Resolved.  -Continue to monitor.  BMP in the morning.    Fall- (present on admission)  Assessment & Plan  - Discharge planning as above.         VTE prophylaxis: Lovenox SQ

## 2025-07-28 NOTE — DISCHARGE PLANNING
1040  Per [facility] response [facility] has accepted. CAL sent referral to [facility] for leased bed per MADISON Ruiz.     1041  Agency/Facility Name: [facility]  Outcome: DPA messaged Sara via Teams to ask if referral acceptance is for leased bed? DPA awaiting response/call back. MADISON Ruiz notified.     1150  Agency/Facility Name: [facility]  Spoke To: Wally  Outcome: DPA messaged Wally to ask for bed availability. Per [facility] there is a leased bed open at [facility]. MADISON Ruiz.     1152  Agency/Facility Name: [facility]  Spoke To: Sara  Outcome: Sara replied to notify that there is beds available. DPA asked what time can transport can be arranged? DPA awaiting response.    1200  Agency/Facility Name: [facility]  Spoke To: Sara   Outcome: Sara  messaged back to request transport to be arranged 1530. MADISON Ruiz notified.     1346  Agency/Facility Name: [facility]   Spoke To: Sara  Outcome: DPA messaged Sara to inform that transport has been confirmed for 1700.

## 2025-07-28 NOTE — PROGRESS NOTES
Virtual Nurse  Discharge instructions reviewed with patient & . All questions answered. Primary nurse notified.